# Patient Record
Sex: MALE | Race: WHITE | NOT HISPANIC OR LATINO | Employment: FULL TIME | URBAN - METROPOLITAN AREA
[De-identification: names, ages, dates, MRNs, and addresses within clinical notes are randomized per-mention and may not be internally consistent; named-entity substitution may affect disease eponyms.]

---

## 2017-03-21 ENCOUNTER — OFFICE VISIT (OUTPATIENT)
Dept: PEDIATRICS | Facility: CLINIC | Age: 37
End: 2017-03-21
Payer: COMMERCIAL

## 2017-03-21 VITALS
DIASTOLIC BLOOD PRESSURE: 70 MMHG | OXYGEN SATURATION: 99 % | TEMPERATURE: 99.1 F | HEIGHT: 70 IN | WEIGHT: 202 LBS | HEART RATE: 90 BPM | BODY MASS INDEX: 28.92 KG/M2 | SYSTOLIC BLOOD PRESSURE: 114 MMHG

## 2017-03-21 DIAGNOSIS — J10.1 INFLUENZA B: ICD-10-CM

## 2017-03-21 DIAGNOSIS — J02.0 ACUTE STREPTOCOCCAL PHARYNGITIS: Primary | ICD-10-CM

## 2017-03-21 DIAGNOSIS — R50.9 FEVER, UNSPECIFIED: ICD-10-CM

## 2017-03-21 LAB
DEPRECATED S PYO AG THROAT QL EIA: ABNORMAL
FLUAV+FLUBV AG SPEC QL: ABNORMAL
FLUAV+FLUBV AG SPEC QL: NEGATIVE
MICRO REPORT STATUS: ABNORMAL
SPECIMEN SOURCE: ABNORMAL
SPECIMEN SOURCE: ABNORMAL

## 2017-03-21 PROCEDURE — 87804 INFLUENZA ASSAY W/OPTIC: CPT | Mod: 59 | Performed by: INTERNAL MEDICINE

## 2017-03-21 PROCEDURE — 87880 STREP A ASSAY W/OPTIC: CPT | Performed by: INTERNAL MEDICINE

## 2017-03-21 PROCEDURE — 99214 OFFICE O/P EST MOD 30 MIN: CPT | Performed by: INTERNAL MEDICINE

## 2017-03-21 RX ORDER — OSELTAMIVIR PHOSPHATE 75 MG/1
75 CAPSULE ORAL 2 TIMES DAILY
Qty: 10 CAPSULE | Refills: 0 | Status: SHIPPED | OUTPATIENT
Start: 2017-03-21 | End: 2018-04-04

## 2017-03-21 RX ORDER — AMOXICILLIN 500 MG/1
500 CAPSULE ORAL 2 TIMES DAILY
Qty: 20 CAPSULE | Refills: 0 | Status: SHIPPED | OUTPATIENT
Start: 2017-03-21 | End: 2017-03-31

## 2017-03-21 NOTE — PROGRESS NOTES
"  SUBJECTIVE:                                                    Jose Luis Mazariegos is a 36 year old male who presents to clinic today for the following health issues:      Acute Illness   Acute illness concerns: sore throat, myalgias fatigue, headache, chills  Onset: 2 days    Fever: no    Chills/Sweats: YES    Headache (location?): YES    Sinus Pressure:YES    Conjunctivitis:  no    Ear Pain: no    Rhinorrhea: no    Congestion: YES    Sore Throat: YES     Cough: no    Wheeze: no    Decreased Appetite: no    Nausea: no    Vomiting: no    Diarrhea:  YES    Dysuria/Freq.: no    Fatigue/Achiness: YES    Sick/Strep Exposure: YES- son     Therapies Tried and outcome:       Patient Active Problem List   Diagnosis     CARDIOVASCULAR SCREENING; LDL GOAL LESS THAN 160     Past Surgical History   Procedure Laterality Date     Rectal fistula  2009       Social History   Substance Use Topics     Smoking status: Former Smoker     Types: Cigarettes     Quit date: 7/14/2000     Smokeless tobacco: Never Used     Alcohol use Yes      Comment: rarely     Family History   Problem Relation Age of Onset     Other Cancer Mother      Depression Sister      Anxiety Disorder Sister              ROS: The following systems have been completely reviewed and are negative except as noted in the HPI: CONSTITUTIONAL, HEAD AND NECK, PULMONARY    OBJECTIVE:                                                    /70 (Cuff Size: Adult Large)  Pulse 90  Temp 99.1  F (37.3  C) (Oral)  Ht 5' 9.5\" (1.765 m)  Wt 202 lb (91.6 kg)  SpO2 99%  BMI 29.4 kg/m2 Body mass index is 29.4 kg/(m^2).  GENERAL:  alert,  no distress  HENT: ear canals- normal; TMs- normal; oropharynx-   erythematous  NECK: no tenderness, no adenopathy  RESP: lungs clear to auscultation - no rales, no rhonchi, no wheezes  CV: regular rates and rhythm, normal S1 S2, no S3 or S4 and no murmur, no click or rub   MS: extremities- no edema     Results for orders placed or performed in visit on " 03/21/17   Strep, Rapid Screen   Result Value Ref Range    Specimen Description Throat     Rapid Strep A Screen (A)      POSITIVE: Group A Streptococcal antigen detected by immunoassay.    Micro Report Status FINAL 03/21/2017    Influenza A/B antigen   Result Value Ref Range    Influenza A/B Agn Specimen Nasal     Influenza A Negative NEG    Influenza B (A) NEG     Positive   Test results must be correlated with clinical data. If necessary, results   should be confirmed by a molecular assay or viral culture.          ASSESSMENT/PLAN:                                                        ICD-10-CM    1. Acute streptococcal pharyngitis J02.0 Strep, Rapid Screen     amoxicillin (AMOXIL) 500 MG capsule     Fluids, symptomatic cares. Start amoxicillin.      2. Influenza B J10.1 oseltamivir (TAMIFLU) 75 MG capsule   3. Fever, unspecified R50.9 Influenza A/B antigen      Influenza B. Discussed symptomatic cares.  Start Tamiflu       Kareem Houston MD  [facility]

## 2017-03-21 NOTE — LETTER
[facility]  [address]  [address]  [place] [address]        March 21, 2017    RE:    Jose Luis Mazariegos                                                                                     [address] [facility] [address]            To whom it may concern:    Jose Luis Mazariegos is under my professional care and was seen in the office today.  Off work due to illness on the following dates: March 21 and 22nd.          Sincerely,        Kareem Houston MD

## 2017-03-21 NOTE — NURSING NOTE
"Chief Complaint   Patient presents with     URI       Initial /70 (Cuff Size: Adult Large)  Pulse 90  Temp 99.1  F (37.3  C) (Oral)  Ht 5' 9.5\" (1.765 m)  Wt 202 lb (91.6 kg)  SpO2 99%  BMI 29.4 kg/m2 Estimated body mass index is 29.4 kg/(m^2) as calculated from the following:    Height as of this encounter: 5' 9.5\" (1.765 m).    Weight as of this encounter: 202 lb (91.6 kg).  Medication Reconciliation: complete  "

## 2017-03-21 NOTE — PATIENT INSTRUCTIONS
INSTRUCTIONS FOR TODAY:     amoxicillin for 10 days    Symptom management for cough and upper respiratory symptoms:    Sore throat: Warm salt water gargle as needed    Cough: Guaifenesin- an expectorant that helps to thin mucus and allow it to be cleared more easily.  Dextromethorphan helps to suppress cough.    Nasal and sinus congestion and drainage: Decongestants such as pseudoephedrine or phenylephrine help to reduce secretions and relieve stuffiness and pressure-related discomfort. Nasal saline spray can also be used to relieve sinus congestion as well.  Oxymetazoline (Afrin) spray can be used up to 3 days to manage nasal stuffiness.    Muscle aches and headache: Both acetaminophen and ibuprofen are effective-ibuprofen is slightly more effective for muscle aches and headache.  Ibuprofen should always be taken with food and plenty of fluids.    Dr Houston

## 2017-03-21 NOTE — MR AVS SNAPSHOT
After Visit Summary   3/21/2017    Jose Luis Mazariegos    MRN: [id number]           Patient Information     Date Of Birth          1980        Visit Information        Provider Department      3/21/2017 8:20 AM [facility]areem [facility] MD [facility]        Today's Diagnoses     Acute streptococcal pharyngitis    -  1    Fever, unspecified          Care Instructions    INSTRUCTIONS FOR TODAY:     amoxicillin for 10 days    Symptom management for cough and upper respiratory symptoms:    Sore throat: Warm salt water gargle as needed    Cough: Guaifenesin- an expectorant that helps to thin mucus and allow it to be cleared more easily.  Dextromethorphan helps to suppress cough.    Nasal and sinus congestion and drainage: Decongestants such as pseudoephedrine or phenylephrine help to reduce secretions and relieve stuffiness and pressure-related discomfort. Nasal saline spray can also be used to relieve sinus congestion as well.  Oxymetazoline (Afrin) spray can be used up to 3 days to manage nasal stuffiness.    Muscle aches and headache: Both acetaminophen and ibuprofen are effective-ibuprofen is slightly more effective for muscle aches and headache.  Ibuprofen should always be taken with food and plenty of fluids.    Dr Houston          Follow-ups after your visit        Who to contact     If you have questions or need follow up information about today's clinic visit or your schedule please contact [facility] directly at [phone].  Normal or non-critical lab and imaging results will be communicated to you by MyChart, letter or phone within 4 business days after the clinic has received the results. If you do not hear from us within 7 days, please contact the clinic through MyChart or phone. If you have a critical or abnormal lab result, we will notify you by phone as soon as possible.  Submit refill requests through [employer]. or call your pharmacy and they will forward the refill request  "to us. Please allow 3 business days for your refill to be completed.          Additional Information About Your Visit        [employer] Information     [employer] gives you secure access to your electronic health record. If you see a primary care provider, you can also send messages to your care team and make appointments. If you have questions, please call your primary care clinic.  If you do not have a primary care provider, please call [phone] and they will assist you.        Care EveryWhere ID     This is your Care EveryWhere ID. This could be used by other organizations to access your [facility] medical records  [id number]        Your Vitals Were     Pulse Temperature Height Pulse Oximetry BMI (Body Mass Index)       90 99.1  F (37.3  C) (Oral) 5' 9.5\" (1.765 m) 99% 29.4 kg/m2        Blood Pressure from Last 3 Encounters:   03/21/17 114/70   04/04/16 114/78   10/10/14 136/84    Weight from Last 3 Encounters:   03/21/17 202 lb (91.6 kg)   04/04/16 210 lb (95.3 kg)   10/10/14 219 lb 1.6 oz (99.4 kg)              We Performed the Following     Influenza A/B antigen     Strep, Rapid Screen          Today's Medication Changes          These changes are accurate as of: 3/21/17  8:52 AM.  If you have any questions, ask your nurse or doctor.               Start taking these medicines.        Dose/Directions    amoxicillin 500 MG capsule   Commonly known as:  AMOXIL   Used for:  Acute streptococcal pharyngitis   Started by:  Kareem Houston MD        Dose:  500 mg   Take 1 capsule (500 mg) by mouth 2 times daily for 10 days   Quantity:  20 capsule   Refills:  0         Stop taking these medicines if you haven't already. Please contact your care team if you have questions.     cyclobenzaprine 10 MG tablet   Commonly known as:  FLEXERIL   Stopped by:  Kareem Houston MD                Where to get your medicines      These medications were sent to [facility] [address] [place] [address]  " [address] [facility] [address]     Phone:  [phone]     amoxicillin 500 MG capsule                Primary Care Provider Office Phone # Fax #    Marycarmen Hodge [phone]       [address] [place] [address]        Thank you!     Thank you for choosing [facility]  for your care. Our goal is always to provide you with excellent care. Hearing back from our patients is one way we can continue to improve our services. Please take a few minutes to complete the written survey that you may receive in the mail after your visit with us. Thank you!             Your Updated Medication List - Protect others around you: Learn how to safely use, store and throw away your medicines at www.disposemymeds.org.          This list is accurate as of: 3/21/17  8:52 AM.  Always use your most recent med list.                   Brand Name Dispense Instructions for use    amoxicillin 500 MG capsule    AMOXIL    20 capsule    Take 1 capsule (500 mg) by mouth 2 times daily for 10 days

## 2018-04-04 ENCOUNTER — OFFICE VISIT (OUTPATIENT)
Dept: PEDIATRICS | Facility: CLINIC | Age: 38
End: 2018-04-04
Payer: COMMERCIAL

## 2018-04-04 ENCOUNTER — TELEPHONE (OUTPATIENT)
Dept: PEDIATRICS | Facility: CLINIC | Age: 38
End: 2018-04-04

## 2018-04-04 VITALS
BODY MASS INDEX: 32.5 KG/M2 | HEART RATE: 77 BPM | DIASTOLIC BLOOD PRESSURE: 80 MMHG | OXYGEN SATURATION: 97 % | TEMPERATURE: 99 F | SYSTOLIC BLOOD PRESSURE: 124 MMHG | HEIGHT: 70 IN | WEIGHT: 227 LBS

## 2018-04-04 DIAGNOSIS — R10.32 ABDOMINAL PAIN, LEFT LOWER QUADRANT: Primary | ICD-10-CM

## 2018-04-04 PROCEDURE — 99213 OFFICE O/P EST LOW 20 MIN: CPT | Mod: GE | Performed by: STUDENT IN AN ORGANIZED HEALTH CARE EDUCATION/TRAINING PROGRAM

## 2018-04-04 NOTE — TELEPHONE ENCOUNTER
"For the past couple of weeks has had abdominal cramping located in mid lower abdomen.  Has been more noticeable in the past few days.  Describes this as \"mild\".  No fever.  No other symptoms.  States he is doing all of his normal activities without any problems, including working out.  Appointment scheduled this afternoon.  No further questions.  Will call back if any other questions or concerns.  INDIA Tobias RN    "

## 2018-04-04 NOTE — PROGRESS NOTES
SUBJECTIVE:   Jose Luis Mazariegos is a 37 year old male who presents to clinic today for the following health issues:    Abdominal Pain      Duration: ongoing for awhile but has been more noticeable for the last week     Description (location/character/radiation): lower abdominal pain to the left side, pain raiating towards groin area        Associated flank pain: None    Intensity:  moderate    Accompanying signs and symptoms:        Fever/Chills: no        Gas/Bloating: no        Nausea/vomitting: no        Diarrhea: no        Dysuria or Hematuria: no child keep his poisoning    History (previous similar pain/trauma/previous testing): NA    Precipitating or alleviating factors:       Pain worse with eating/BM/urination: noticeable when diet is poor       Pain relieved by BM: no     Therapies tried and outcome: None    LMP:  not applicable     He had the pain once for a few hours several months ago.  In the last month he has had the pain several times.  Last 3-4 days he has had the pain near constantly.  At max it is a 2 out of 10 pain right now it is 1 out of 10.  It has not stopped him from doing anything in his life including eating drinking sleeping exercising weightlifting.  His routine has not changed.  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  Weightlifting regimen has not changed recently.  The pain is in the left lower quadrant, suprapubic area, radiates to the groin.  Does not radiate to the back.  On the unaffected by eating.  Has never had associated nausea or vomiting.  Has gained weight recently.  Denies dysuria and hematuria.  Has no change in his erectile function or ejaculate.  He is not constipated.  No penile discharge.       Problem list and histories reviewed & adjusted, as indicated.  Additional history: as documented    Patient Active Problem List   Diagnosis     CARDIOVASCULAR SCREENING; LDL GOAL LESS THAN 160     Past Surgical History:   Procedure Laterality Date     rectal fistula  2009       Social  "History   Substance Use Topics     Smoking status: Former Smoker     Types: Cigarettes     Quit date: 7/14/2000     Smokeless tobacco: Never Used     Alcohol use Yes      Comment: rarely     Family History   Problem Relation Age of Onset     Other Cancer Mother      Depression Sister      Anxiety Disorder Sister            Reviewed and updated as needed this visit by clinical staff       Reviewed and updated as needed this visit by Provider         ROS:  Constitutional, HEENT, cardiovascular, pulmonary, gi and gu systems are negative, except as otherwise noted.    OBJECTIVE:     /80 (BP Location: Right arm, Patient Position: Right side, Cuff Size: Adult Large)  Pulse 77  Temp 99  F (37.2  C) (Tympanic)  Ht 5' 9.5\" (1.765 m)  Wt 227 lb (103 kg)  SpO2 97%  BMI 33.04 kg/m2  Body mass index is 33.04 kg/(m^2).  GENERAL: healthy, alert and no distress  RESP: lungs clear to auscultation - no rales, rhonchi or wheezes  CV: regular rate and rhythm, normal S1 S2, no S3 or S4, no murmur, click or rub, no peripheral edema and peripheral pulses strong  ABDOMEN: soft, nontender, no hepatosplenomegaly, no masses and bowel sounds normal   (male): testicles normal without atrophy or masses, no hernias and penis normal without urethral discharge  MS: no gross musculoskeletal defects noted, no edema    Diagnostic Test Results:  none     ASSESSMENT/PLAN:     1. Abdominal pain, left lower quadrant  Very well-appearing, healthy gentleman here with mild pain that is not negatively affecting his day-to-day functioning.  I reviewed with him the ways in which my exam is very reassuring.  At this time, I doubt GI pathology such as diverticulitis, colitis, constipation.  I also doubt genitourinary pathology such as UTI, epididymitis given totally normal review of systems and testicular exam.  We discussed that we will use time as a diagnostic tool.  We will hope that he gets better and if he gets worse he will return to clinic at " that time can consider laboratory evaluation including blood and urine.  In the absence of red flag symptoms, I do not feel imaging is necessary at this time      As needed, follow up for worsening symptoms    Delfina Velasco MD  [facility]    I have discussed the patient with the resident and agree with the jointly developed plan as documented above    Marta Renner MD  Internal Medicine - Pediatrics

## 2018-04-04 NOTE — MR AVS SNAPSHOT
"              After Visit Summary   4/4/2018    Jose Luis Mazariegos    MRN: [id number]           Patient Information     Date Of Birth          1980        Visit Information        Provider Department      4/4/2018 3:45 PM Delfina Velasco MD [facility]        Today's Diagnoses     Abdominal pain, left lower quadrant    -  1       Follow-ups after your visit        Who to contact     If you have questions or need follow up information about today's clinic visit or your schedule please contact [facility] directly at [phone].  Normal or non-critical lab and imaging results will be communicated to you by [employer], letter or phone within 4 business days after the clinic has received the results. If you do not hear from us within 7 days, please contact the clinic through [employer] or phone. If you have a critical or abnormal lab result, we will notify you by phone as soon as possible.  Submit refill requests through [employer] or call your pharmacy and they will forward the refill request to us. Please allow 3 business days for your refill to be completed.          Additional Information About Your Visit        MyChart Information     [employer] gives you secure access to your electronic health record. If you see a primary care provider, you can also send messages to your care team and make appointments. If you have questions, please call your primary care clinic.  If you do not have a primary care provider, please call [phone] and they will assist you.        Care EveryWhere ID     This is your Care EveryWhere ID. This could be used by other organizations to access your [facility] medical records  [id number]        Your Vitals Were     Pulse Temperature Height Pulse Oximetry BMI (Body Mass Index)       77 99  F (37.2  C) (Tympanic) 5' 9.5\" (1.765 m) 97% 33.04 kg/m2        Blood Pressure from Last 3 Encounters:   04/04/18 124/80   03/21/17 114/70   04/04/16 114/78    Weight from Last 3 " Encounters:   04/04/18 227 lb (103 kg)   03/21/17 202 lb (91.6 kg)   04/04/16 210 lb (95.3 kg)              Today, you had the following     No orders found for display       Primary Care Provider Office Phone # Fax #    Marycarmen Hodge [phone] [address] [place] [address]        Equal Access to Services     [facility]: Hadii aad ku hadasho Soomaali, waaxda luqadaha, qaybta kaalmada adeegyada, waxay idiin hayaan adeeg kharash la'aan ah. So [facility] [phone].    ATENCIÓN: Si habla español, tiene a valle disposición servicios gratuitos de asistencia lingüística. Llame al [phone].    We comply with applicable federal civil rights laws and [place] laws. We do not discriminate on the basis of race, color, national origin, age, disability, sex, sexual orientation, or gender identity.            Thank you!     Thank you for choosing [facility]  for your care. Our goal is always to provide you with excellent care. Hearing back from our patients is one way we can continue to improve our services. Please take a few minutes to complete the written survey that you may receive in the mail after your visit with us. Thank you!             Your Updated Medication List - Protect others around you: Learn how to safely use, store and throw away your medicines at www.disposemymeds.org.      Notice  As of 4/4/2018 11:59 PM    You have not been prescribed any medications.

## 2018-05-16 ENCOUNTER — TELEPHONE (OUTPATIENT)
Dept: PEDIATRICS | Facility: CLINIC | Age: 38
End: 2018-05-16

## 2019-01-07 ASSESSMENT — ENCOUNTER SYMPTOMS
DYSURIA: 0
WEAKNESS: 0
MYALGIAS: 0
DIARRHEA: 0
COUGH: 0
PARESTHESIAS: 0
ABDOMINAL PAIN: 0
CHILLS: 0
DIZZINESS: 0
CONSTIPATION: 0
NERVOUS/ANXIOUS: 0
JOINT SWELLING: 0
FEVER: 0
PALPITATIONS: 0
SHORTNESS OF BREATH: 0
HEADACHES: 0
HEMATURIA: 0
ARTHRALGIAS: 0
HEARTBURN: 0
FREQUENCY: 0
EYE PAIN: 0
SORE THROAT: 0
NAUSEA: 0
HEMATOCHEZIA: 0

## 2019-01-08 ENCOUNTER — OFFICE VISIT (OUTPATIENT)
Dept: PEDIATRICS | Facility: CLINIC | Age: 39
End: 2019-01-08
Payer: COMMERCIAL

## 2019-01-08 VITALS
WEIGHT: 235 LBS | TEMPERATURE: 98.2 F | HEIGHT: 70 IN | HEART RATE: 68 BPM | OXYGEN SATURATION: 99 % | SYSTOLIC BLOOD PRESSURE: 118 MMHG | BODY MASS INDEX: 33.64 KG/M2 | DIASTOLIC BLOOD PRESSURE: 76 MMHG

## 2019-01-08 DIAGNOSIS — Z00.00 ENCOUNTER FOR ROUTINE ADULT HEALTH EXAMINATION WITHOUT ABNORMAL FINDINGS: Primary | ICD-10-CM

## 2019-01-08 DIAGNOSIS — E66.9 OBESITY, UNSPECIFIED OBESITY SEVERITY, UNSPECIFIED OBESITY TYPE: ICD-10-CM

## 2019-01-08 DIAGNOSIS — Z13.6 CARDIOVASCULAR SCREENING; LDL GOAL LESS THAN 160: ICD-10-CM

## 2019-01-08 LAB
ANION GAP SERPL CALCULATED.3IONS-SCNC: 5 MMOL/L (ref 3–14)
BUN SERPL-MCNC: 10 MG/DL (ref 7–30)
CALCIUM SERPL-MCNC: 9 MG/DL (ref 8.5–10.1)
CHLORIDE SERPL-SCNC: 109 MMOL/L (ref 94–109)
CHOLEST SERPL-MCNC: 186 MG/DL
CO2 SERPL-SCNC: 27 MMOL/L (ref 20–32)
CREAT SERPL-MCNC: 0.91 MG/DL (ref 0.66–1.25)
GFR SERPL CREATININE-BSD FRML MDRD: >90 ML/MIN/{1.73_M2}
GLUCOSE SERPL-MCNC: 99 MG/DL (ref 70–99)
HDLC SERPL-MCNC: 50 MG/DL
LDLC SERPL CALC-MCNC: 124 MG/DL
NONHDLC SERPL-MCNC: 136 MG/DL
POTASSIUM SERPL-SCNC: 3.9 MMOL/L (ref 3.4–5.3)
SODIUM SERPL-SCNC: 141 MMOL/L (ref 133–144)
TRIGL SERPL-MCNC: 58 MG/DL

## 2019-01-08 PROCEDURE — 80048 BASIC METABOLIC PNL TOTAL CA: CPT | Performed by: INTERNAL MEDICINE

## 2019-01-08 PROCEDURE — 90471 IMMUNIZATION ADMIN: CPT | Performed by: INTERNAL MEDICINE

## 2019-01-08 PROCEDURE — 90715 TDAP VACCINE 7 YRS/> IM: CPT | Performed by: INTERNAL MEDICINE

## 2019-01-08 PROCEDURE — 80061 LIPID PANEL: CPT | Performed by: INTERNAL MEDICINE

## 2019-01-08 PROCEDURE — 99395 PREV VISIT EST AGE 18-39: CPT | Mod: 25 | Performed by: INTERNAL MEDICINE

## 2019-01-08 PROCEDURE — 36415 COLL VENOUS BLD VENIPUNCTURE: CPT | Performed by: INTERNAL MEDICINE

## 2019-01-08 ASSESSMENT — ENCOUNTER SYMPTOMS
DIARRHEA: 0
NAUSEA: 0
HEADACHES: 0
HEMATOCHEZIA: 0
SHORTNESS OF BREATH: 0
PARESTHESIAS: 0
WEAKNESS: 0
EYE PAIN: 0
SORE THROAT: 0
ARTHRALGIAS: 0
FEVER: 0
MYALGIAS: 0
COUGH: 0
HEARTBURN: 0
JOINT SWELLING: 0
DYSURIA: 0
CHILLS: 0
PALPITATIONS: 0
FREQUENCY: 0
NERVOUS/ANXIOUS: 0
CONSTIPATION: 0
HEMATURIA: 0
ABDOMINAL PAIN: 0
DIZZINESS: 0

## 2019-01-08 ASSESSMENT — MIFFLIN-ST. JEOR: SCORE: 1984.26

## 2019-01-08 NOTE — PROGRESS NOTES
SUBJECTIVE:   CC: Jose Luis Mazariegos is an 38 year old male who presents for preventative health visit.     Physical   Annual:     Getting at least 3 servings of Calcium per day:  Yes    Bi-annual eye exam:  Yes    Dental care twice a year:  NO    Sleep apnea or symptoms of sleep apnea:  None    Diet:  Regular (no restrictions)    Frequency of exercise:  2-3 days/week    Duration of exercise:  45-60 minutes    Taking medications regularly:  Yes    Medication side effects:  Not applicable    Additional concerns today:  No    PHQ-2 Total Score: 0              Today's PHQ-2 Score:   PHQ-2 (  [employer]) 2019   Q1: Little interest or pleasure in doing things 0   Q2: Feeling down, depressed or hopeless 0   PHQ-2 Score 0   Q1: Little interest or pleasure in doing things Not at all   Q2: Feeling down, depressed or hopeless Not at all   PHQ-2 Score 0       Abuse: Current or Past(Physical, Sexual or Emotional)- No  Do you feel safe in your environment? Yes    Social History     Tobacco Use     Smoking status: Former Smoker     Types: Cigarettes     Last attempt to quit: 2000     Years since quittin.4     Smokeless tobacco: Never Used   Substance Use Topics     Alcohol use: Yes     Comment: rarely     Alcohol Use 2019   If you drink alcohol do you typically have greater than 3 drinks per day OR greater than 7 drinks per week? No       Last PSA: No results found for: PSA    Reviewed orders with patient. Reviewed health maintenance and updated orders accordingly - Yes      Reviewed and updated as needed this visit by clinical staff  Tobacco  Allergies  Meds         Reviewed and updated as needed this visit by Provider          Patient Active Problem List   Diagnosis     CARDIOVASCULAR SCREENING; LDL GOAL LESS THAN 160     Obesity, unspecified obesity severity, unspecified obesity type     Past Medical History:   Diagnosis Date     Perianal fistula        Past Surgical History:   Procedure Laterality Date      "rectal fistula  2009       Family History   Problem Relation Age of Onset     Other Cancer Mother      Depression Sister      Anxiety Disorder Sister      Colon Cancer No family hx of      Prostate Cancer No family hx of      Diabetes No family hx of      Hypertension No family hx of        ALLERGIES   No Known Allergies      Review of Systems   Constitutional: Negative for chills and fever.   HENT: Negative for congestion, ear pain, hearing loss and sore throat.    Eyes: Negative for pain and visual disturbance.   Respiratory: Negative for cough and shortness of breath.    Cardiovascular: Negative for chest pain, palpitations and peripheral edema.   Gastrointestinal: Negative for abdominal pain, constipation, diarrhea, heartburn, hematochezia and nausea.   Genitourinary: Negative for discharge, dysuria, frequency, genital sores, hematuria, impotence and urgency.   Musculoskeletal: Negative for arthralgias, joint swelling and myalgias.   Skin: Negative for rash.   Neurological: Negative for dizziness, weakness, headaches and paresthesias.   Psychiatric/Behavioral: Negative for mood changes. The patient is not nervous/anxious.          OBJECTIVE:   /76 (Cuff Size: Adult Large)   Pulse 68   Temp 98.2  F (36.8  C) (Oral)   Ht 1.765 m (5' 9.5\")   Wt 106.6 kg (235 lb)   SpO2 99%   BMI 34.21 kg/m      Physical Exam  GENERAL: healthy, alert and no distress  EYES: Eyes grossly normal to inspection, PERRL and conjunctivae and sclerae normal  HENT: ear canals and TM's normal, nose and mouth without ulcers or lesions  NECK: no adenopathy, no asymmetry, masses, or scars and thyroid normal to palpation  RESP: lungs clear to auscultation - no rales, rhonchi or wheezes  CV: regular rate and rhythm, normal S1 S2, no S3 or S4, no murmur, click or rub, no peripheral edema and peripheral pulses strong  ABDOMEN: soft, nontender, no hepatosplenomegaly, no masses and bowel sounds normal  MS: no gross musculoskeletal defects " "noted, no edema  SKIN: no suspicious lesions or rashes  NEURO: Normal strength and tone, mentation intact and speech normal  PSYCH: mentation appears normal, affect normal/bright      ASSESSMENT/PLAN:       ICD-10-CM    1. Encounter for routine adult health examination without abnormal findings Z00.00        2. Obesity, unspecified obesity severity, unspecified obesity type E66.9 Weight management and obesity are discussed with the patient.  discussed the balance between caloric intake and caloric expenditure.  discussed the caloric content of certain types of foods as well as healthy eating habits.  encouraged the patient to monitor portion sizes and consider a calorie restrictive diet or plan       Goal weight 205-210lbs     3. CARDIOVASCULAR SCREENING; LDL GOAL LESS THAN 160 Z13.6 Lipid panel reflex to direct LDL Fasting     Basic metabolic panel  (Ca, Cl, CO2, Creat, Gluc, K, Na, BUN)       COUNSELING:   Reviewed preventive health counseling, as reflected in patient instructions       Regular exercise       Healthy diet/nutrition       Colon cancer screening       Prostate cancer screening    BP Readings from Last 1 Encounters:   01/08/19 118/76     Estimated body mass index is 34.21 kg/m  as calculated from the following:    Height as of this encounter: 1.765 m (5' 9.5\").    Weight as of this encounter: 106.6 kg (235 lb).    Weight management plan: Discussed healthy diet and exercise guidelines     reports that he quit smoking about 18 years ago. His smoking use included cigarettes. he has never used smokeless tobacco.      Counseling Resources:  ATP IV Guidelines  Pooled Cohorts Equation Calculator  FRAX Risk Assessment  ICSI Preventive Guidelines  Dietary Guidelines for Americans, 2010  USDA's MyPlate  ASA Prophylaxis  Lung CA Screening    Kareem Houston MD, MD  [facility]  "

## 2019-01-08 NOTE — NURSING NOTE
Screening Questionnaire for Adult Immunization    Are you sick today?   No   Do you have allergies to medications, food, a vaccine component or latex?   No   Have you ever had a serious reaction after receiving a vaccination?   No   Do you have a long-term health problem with heart disease, lung disease, asthma, kidney disease, metabolic disease (e.g. diabetes), anemia, or other blood disorder?   No   Do you have cancer, leukemia, HIV/AIDS, or any other immune system problem?   No   In the past 3 months, have you taken medications that affect  your immune system, such as prednisone, other steroids, or anticancer drugs; drugs for the treatment of rheumatoid arthritis, Crohn s disease, or psoriasis; or have you had radiation treatments?   No   Have you had a seizure, or a brain or other nervous system problem?   No   During the past year, have you received a transfusion of blood or blood     products, or been given immune (gamma) globulin or antiviral drug?   No   For women: Are you pregnant or is there a chance you could become        pregnant during the next month?   No   Have you received any vaccinations in the past 4 weeks?   No     Immunization questionnaire answers were all negative.             Screening performed by Stefany Rangel on 1/8/2019 at 8:22 AM.

## 2019-11-27 ENCOUNTER — OFFICE VISIT (OUTPATIENT)
Dept: PODIATRY | Facility: CLINIC | Age: 39
End: 2019-11-27
Payer: COMMERCIAL

## 2019-11-27 VITALS
WEIGHT: 235 LBS | BODY MASS INDEX: 33.64 KG/M2 | DIASTOLIC BLOOD PRESSURE: 70 MMHG | HEIGHT: 70 IN | SYSTOLIC BLOOD PRESSURE: 116 MMHG

## 2019-11-27 DIAGNOSIS — L84 CALLUS OF FOOT: Primary | ICD-10-CM

## 2019-11-27 PROCEDURE — 99203 OFFICE O/P NEW LOW 30 MIN: CPT | Performed by: PODIATRIST

## 2019-11-27 ASSESSMENT — MIFFLIN-ST. JEOR: SCORE: 1979.26

## 2019-11-27 NOTE — LETTER
"    11/27/2019         RE: Jose Luis Mazariegos  [address] [place] [address]        Dear Colleague,    Thank you for referring your patient, Jose Luis Mazariegos, to the [facility]. Please see a copy of my visit note below.      ASSESSMENT/PLAN:    Encounter Diagnosis   Name Primary?     Callus of foot Yes     I did not find any problems.   Nothing to suggest warts.  I explained that there is likely skin rubbing in certain shoes and that the proximal interphalangeal joints are somewhat prominent.   I advised shoes with a taller toe box  Padding is an option - toe sleeves  There is nothing for him to file.  This is more thickening of skin than true callus formation.    Body mass index is 34.21 kg/m .    Weight management plan: Patient was referred to their PCP to discuss a diet and exercise plan.      Femi Cruz DPM, FACFAS, MS    [facility]      ____________________________________________________________________    HPI:         Chief Complaint: wart, rash on left second and third toe  Onset of problem: 1 week  He said that he had mild discomfort.  Previous treatment: Compound W, \"Freeze off\"  *  Patient Active Problem List   Diagnosis     CARDIOVASCULAR SCREENING; LDL GOAL LESS THAN 160     Obesity, unspecified obesity severity, unspecified obesity type   *  *  Past Surgical History:   Procedure Laterality Date     rectal fistula  2009   *  *  No current outpatient medications on file.       ROS:     A 10-point review of systems was performed and is positive for that noted above in the HPI and as seen below.  All other areas are negative.     Numbness in feet?  no   Calf pain with walking? no  Recent foot/ankle injury? no  Weight change over past 12 months? no  Self perception as overweight? yes  Recent flu-like symptoms? no  Joint pain other than feet ? no    Social History: Employment:  finance;  Exercise/Physical activity:  Running, lifting " "1-2x/ week;  Tobacco use:  no  Social History     Socioeconomic History     Marital status:      Spouse name: Not on file     Number of children: Not on file     Years of education: Not on file     Highest education level: Not on file   Occupational History     Not on file   Social Needs     Financial resource strain: Not on file     Food insecurity:     Worry: Not on file     Inability: Not on file     Transportation needs:     Medical: Not on file     Non-medical: Not on file   Tobacco Use     Smoking status: Former Smoker     Types: Cigarettes     Last attempt to quit: 2000     Years since quittin.3     Smokeless tobacco: Never Used   Substance and Sexual Activity     Alcohol use: Yes     Comment: rarely     Drug use: No     Sexual activity: Yes     Partners: Female   Lifestyle     Physical activity:     Days per week: Not on file     Minutes per session: Not on file     Stress: Not on file   Relationships     Social connections:     Talks on phone: Not on file     Gets together: Not on file     Attends [employer]ism service: Not on file     Active member of club or organization: Not on file     Attends meetings of clubs or organizations: Not on file     Relationship status: Not on file     Intimate partner violence:     Fear of current or ex partner: Not on file     Emotionally abused: Not on file     Physically abused: Not on file     Forced sexual activity: Not on file   Other Topics Concern     Parent/sibling w/ CABG, MI or angioplasty before 65F 55M? No   Social History Narrative     Not on file       Family history:  Family History   Problem Relation Age of Onset     Other Cancer Mother      Depression Sister      Anxiety Disorder Sister      Colon Cancer No family hx of      Prostate Cancer No family hx of      Diabetes No family hx of      Hypertension No family hx of        Rheumatoid arthritis:  no  Foot Problems: no  Diabetes: no      EXAM:    Vitals: /70   Ht 1.765 m (5' 9.5\")   " "Wt 106.6 kg (235 lb)   BMI 34.21 kg/m     BMI: Body mass index is 34.21 kg/m .  Height: 5' 9.5\"    Constitutional/ general:  Pt is in no apparent distress, appears well-nourished.  Cooperative with history and physical exam.     Vascular:  Pedal pulses are palpable bilaterally for both the DP and PT arteries.  CFT < 3 sec.  No edema.  Pedal hair growth noted.     Neuro:  Alert and oriented x 3. Coordinated gait.  Light touch sensation is intact to the L4, L5, S1 distributions. No obvious deficits.  No evidence of neurological-based weakness, spasticity, or contracture in the lower extremities.     Derm: Normal texture and turgor.  No erythema, ecchymosis, or cyanosis.  No open lesions.   Mild hyperkeratosis with light pigmentation over the left second and third toe proximal interphalangeal joints. No ulcerations. No verrucoid changes.    Musculoskeletal:    Lower extremity muscle strength is normal.  Patient is ambulatory without an assistive device or brace .  No gross deformities.  Mild contractures of the involved toes.               Again, thank you for allowing me to participate in the care of your patient.        Sincerely,        Femi Cruz DPM    "

## 2019-11-27 NOTE — PROGRESS NOTES
"  ASSESSMENT/PLAN:    Encounter Diagnosis   Name Primary?     Callus of foot Yes     I did not find any problems.   Nothing to suggest warts.  I explained that there is likely skin rubbing in certain shoes and that the proximal interphalangeal joints are somewhat prominent.   I advised shoes with a taller toe box  Padding is an option - toe sleeves  There is nothing for him to file.  This is more thickening of skin than true callus formation.    Body mass index is 34.21 kg/m .    Weight management plan: Patient was referred to their PCP to discuss a diet and exercise plan.      Femi Cruz DPM, FACFAS, MS    [facility]      ____________________________________________________________________    HPI:         Chief Complaint: wart, rash on left second and third toe  Onset of problem: 1 week  He said that he had mild discomfort.  Previous treatment: Compound W, \"Freeze off\"  *  Patient Active Problem List   Diagnosis     CARDIOVASCULAR SCREENING; LDL GOAL LESS THAN 160     Obesity, unspecified obesity severity, unspecified obesity type   *  *  Past Surgical History:   Procedure Laterality Date     rectal fistula  2009   *  *  No current outpatient medications on file.       ROS:     A 10-point review of systems was performed and is positive for that noted above in the HPI and as seen below.  All other areas are negative.     Numbness in feet?  no   Calf pain with walking? no  Recent foot/ankle injury? no  Weight change over past 12 months? no  Self perception as overweight? yes  Recent flu-like symptoms? no  Joint pain other than feet ? 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/ week;  Tobacco use:  no  Social History     Socioeconomic History     Marital status:      Spouse name: Not on file     Number of children: Not on file     Years of education: Not on file     Highest education level: Not on file " "  Occupational History     Not on file   Social Needs     Financial resource strain: Not on file     Food insecurity:     Worry: Not on file     Inability: Not on file     Transportation needs:     Medical: Not on file     Non-medical: Not on file   Tobacco Use     Smoking status: Former Smoker     Types: Cigarettes     Last attempt to quit: 2000     Years since quittin.3     Smokeless tobacco: Never Used   Substance and Sexual Activity     Alcohol use: Yes     Comment: rarely     Drug use: No     Sexual activity: Yes     Partners: Female   Lifestyle     Physical activity:     Days per week: Not on file     Minutes per session: Not on file     Stress: Not on file   Relationships     Social connections:     Talks on phone: Not on file     Gets together: Not on file     Attends Hoahaoism service: Not on file     Active member of club or organization: Not on file     Attends meetings of clubs or organizations: Not on file     Relationship status: Not on file     Intimate partner violence:     Fear of current or ex partner: Not on file     Emotionally abused: Not on file     Physically abused: Not on file     Forced sexual activity: Not on file   Other Topics Concern     Parent/sibling w/ CABG, MI or angioplasty before 65F 55M? No   Social History Narrative     Not on file       Family history:  Family History   Problem Relation Age of Onset     Other Cancer Mother      Depression Sister      Anxiety Disorder Sister      Colon Cancer No family hx of      Prostate Cancer No family hx of      Diabetes No family hx of      Hypertension No family hx of        Rheumatoid arthritis:  no  Foot Problems: no  Diabetes: no      EXAM:    Vitals: /70   Ht 1.765 m (5' 9.5\")   Wt 106.6 kg (235 lb)   BMI 34.21 kg/m    BMI: Body mass index is 34.21 kg/m .  Height: 5' 9.5\"    Constitutional/ general:  Pt is in no apparent distress, appears well-nourished.  Cooperative with history and physical exam.     Vascular:  " Pedal pulses are palpable bilaterally for both the DP and PT arteries.  CFT < 3 sec.  No edema.  Pedal hair growth noted.     Neuro:  Alert and oriented x 3. Coordinated gait.  Light touch sensation is intact to the L4, L5, S1 distributions. No obvious deficits.  No evidence of neurological-based weakness, spasticity, or contracture in the lower extremities.     Derm: Normal texture and turgor.  No erythema, ecchymosis, or cyanosis.  No open lesions.   Mild hyperkeratosis with light pigmentation over the left second and third toe proximal interphalangeal joints. No ulcerations. No verrucoid changes.    Musculoskeletal:    Lower extremity muscle strength is normal.  Patient is ambulatory without an assistive device or brace .  No gross deformities.  Mild contractures of the involved toes.

## 2020-03-01 ENCOUNTER — HEALTH MAINTENANCE LETTER (OUTPATIENT)
Age: 40
End: 2020-03-01

## 2020-09-02 ENCOUNTER — OFFICE VISIT (OUTPATIENT)
Dept: FAMILY MEDICINE | Facility: CLINIC | Age: 40
End: 2020-09-02
Payer: COMMERCIAL

## 2020-09-02 VITALS
WEIGHT: 240 LBS | SYSTOLIC BLOOD PRESSURE: 126 MMHG | DIASTOLIC BLOOD PRESSURE: 74 MMHG | BODY MASS INDEX: 34.93 KG/M2 | OXYGEN SATURATION: 100 % | HEART RATE: 85 BPM | RESPIRATION RATE: 16 BRPM | TEMPERATURE: 97.7 F

## 2020-09-02 DIAGNOSIS — N50.89 TESTICLE LUMP: Primary | ICD-10-CM

## 2020-09-02 PROCEDURE — 99214 OFFICE O/P EST MOD 30 MIN: CPT | Performed by: FAMILY MEDICINE

## 2020-09-02 NOTE — PROGRESS NOTES
Subjective     Jose Luis Mazariegos is a 39 year old male who presents to clinic today for the following health issues:    HPI       Concern - mass on testicle  Onset: noticed a few days ago  Description: left testicle, found something abnormal; but unable to feel it today.  Intensity: na  Progression of Symptoms:  na  Accompanying Signs & Symptoms: na  Previous history of similar problem: na  Precipitating factors:        Worsened by: na  Alleviating factors:        Improved by: na  Therapies tried and outcome:  none     Lump is not painful.  No previous lumps palpated in the past.  No discharge or rash.  No fever.   Thinks lump may actually just be part of his normal anatomy ut is unsure.     Review of Systems   CONSTITUTIONAL: NEGATIVE for fever, chills, change in weight  INTEGUMENTARY/SKIN: NEGATIVE for worrisome rashes, moles or lesions  EYES: NEGATIVE for vision changes or irritation  ENT/MOUTH: NEGATIVE for ear, mouth and throat problems  RESP: NEGATIVE for significant cough or SOB  CV: NEGATIVE for chest pain, palpitations or peripheral edema  GI: NEGATIVE for nausea, abdominal pain, heartburn, or change in bowel habits  : NEGATIVE for frequency, dysuria, or hematuria  MUSCULOSKELETAL: NEGATIVE for significant arthralgias or myalgia  NEURO: NEGATIVE for weakness, dizziness or paresthesias  HEME: NEGATIVE for bleeding problems      Objective    /74   Pulse 85   Temp 97.7  F (36.5  C) (Tympanic)   Resp 16   Wt 108.9 kg (240 lb)   SpO2 100%   BMI 34.93 kg/m    Body mass index is 34.93 kg/m .  Physical Exam   GENERAL: healthy, alert and no distress  EYES: Eyes grossly normal to inspection, PERRL and conjunctivae and sclerae normal  HENT: normal cephalic/atraumatic and wearing face mask  NECK: no adenopathy, no asymmetry, masses, or scars and thyroid normal to palpation  RESP: lungs clear to auscultation - no rales, rhonchi or wheezes  CV: regular rate and rhythm, normal S1 S2, no S3 or S4, no murmur,  "click or rub, no peripheral edema and peripheral pulses strong   (male): (joana SEGUNDO present during genital exam) normal male genitalia without lesions or urethral discharge, no hernia; no palpable lumps or nodules.  MS: no gross musculoskeletal defects noted, no edema  SKIN: no suspicious lesions or rashes  NEURO: Normal strength and tone, mentation intact and speech normal    No results found for this or any previous visit (from the past 24 hour(s)).        Assessment & Plan   Problem List Items Addressed This Visit     None      Visit Diagnoses     Testicle lump    -  Primary    Relevant Orders    US Testicular & Scrotum w Doppler Ltd         Unable to palpate any lump on exam, but will obtain ultrasound imaging to help rule this out.     BMI:   Estimated body mass index is 34.93 kg/m  as calculated from the following:    Height as of 11/27/19: 1.765 m (5' 9.5\").    Weight as of this encounter: 108.9 kg (240 lb).   Weight management plan: Discussed healthy diet and exercise guidelines        See Patient Instructions  Return in about 4 weeks (around 9/30/2020) for re-check / follow-up, If needed.    Komal Avila, DO  [facility]    "

## 2020-09-25 ENCOUNTER — OFFICE VISIT (OUTPATIENT)
Dept: FAMILY MEDICINE | Facility: CLINIC | Age: 40
End: 2020-09-25
Payer: COMMERCIAL

## 2020-09-25 ENCOUNTER — NURSE TRIAGE (OUTPATIENT)
Dept: NURSING | Facility: CLINIC | Age: 40
End: 2020-09-25

## 2020-09-25 ENCOUNTER — ANCILLARY PROCEDURE (OUTPATIENT)
Dept: GENERAL RADIOLOGY | Facility: CLINIC | Age: 40
End: 2020-09-25
Attending: PHYSICIAN ASSISTANT
Payer: COMMERCIAL

## 2020-09-25 VITALS
TEMPERATURE: 98.4 F | WEIGHT: 234.56 LBS | DIASTOLIC BLOOD PRESSURE: 76 MMHG | BODY MASS INDEX: 34.14 KG/M2 | SYSTOLIC BLOOD PRESSURE: 128 MMHG | OXYGEN SATURATION: 100 % | HEART RATE: 103 BPM

## 2020-09-25 DIAGNOSIS — R10.84 ABDOMINAL PAIN, GENERALIZED: Primary | ICD-10-CM

## 2020-09-25 DIAGNOSIS — K59.00 CONSTIPATION, UNSPECIFIED CONSTIPATION TYPE: ICD-10-CM

## 2020-09-25 DIAGNOSIS — R10.84 ABDOMINAL PAIN, GENERALIZED: ICD-10-CM

## 2020-09-25 LAB
BASOPHILS # BLD AUTO: 0 10E9/L (ref 0–0.2)
BASOPHILS NFR BLD AUTO: 0.2 %
DIFFERENTIAL METHOD BLD: ABNORMAL
EOSINOPHIL # BLD AUTO: 0.4 10E9/L (ref 0–0.7)
EOSINOPHIL NFR BLD AUTO: 3.3 %
ERYTHROCYTE [DISTWIDTH] IN BLOOD BY AUTOMATED COUNT: 13.8 % (ref 10–15)
HCT VFR BLD AUTO: 50.9 % (ref 40–53)
HGB BLD-MCNC: 17.5 G/DL (ref 13.3–17.7)
LYMPHOCYTES # BLD AUTO: 2.3 10E9/L (ref 0.8–5.3)
LYMPHOCYTES NFR BLD AUTO: 18.1 %
MCH RBC QN AUTO: 28.6 PG (ref 26.5–33)
MCHC RBC AUTO-ENTMCNC: 34.4 G/DL (ref 31.5–36.5)
MCV RBC AUTO: 83 FL (ref 78–100)
MONOCYTES # BLD AUTO: 1.2 10E9/L (ref 0–1.3)
MONOCYTES NFR BLD AUTO: 9.4 %
NEUTROPHILS # BLD AUTO: 8.8 10E9/L (ref 1.6–8.3)
NEUTROPHILS NFR BLD AUTO: 69 %
PLATELET # BLD AUTO: 385 10E9/L (ref 150–450)
RBC # BLD AUTO: 6.12 10E12/L (ref 4.4–5.9)
WBC # BLD AUTO: 12.7 10E9/L (ref 4–11)

## 2020-09-25 PROCEDURE — 85025 COMPLETE CBC W/AUTO DIFF WBC: CPT | Performed by: PHYSICIAN ASSISTANT

## 2020-09-25 PROCEDURE — 36415 COLL VENOUS BLD VENIPUNCTURE: CPT | Performed by: PHYSICIAN ASSISTANT

## 2020-09-25 PROCEDURE — 99214 OFFICE O/P EST MOD 30 MIN: CPT | Performed by: PHYSICIAN ASSISTANT

## 2020-09-25 PROCEDURE — 74019 RADEX ABDOMEN 2 VIEWS: CPT

## 2020-09-25 RX ORDER — POLYETHYLENE GLYCOL 3350 17 G/17G
1 POWDER, FOR SOLUTION ORAL DAILY
Qty: 507 G | Refills: 0 | Status: SHIPPED | OUTPATIENT
Start: 2020-09-25 | End: 2021-03-31

## 2020-09-25 NOTE — TELEPHONE ENCOUNTER
Patient calling in stating abdominal pain the last 2 days ago (Wednesday night), not able to eat a lot since then. Drinking water and eating makes it worse. Thursday he left work and pain was moving around all night. NO vomiting, no diarrhea, no nausea or fever. Pain now is 2/10 and one hour ago it was a 7/10. Last BM was 3-4 days ago and looser than normal. Non black, tarry or red. Pain last night woke him up at times. At [employer] on Wednesday which is when the pain started. Advised an appointment to be seen today.   Irina Whitley RN on 9/25/2020 at 7:37 AM    Additional Information    Negative: Passed out (i.e., fainted, collapsed and was not responding)    Negative: Shock suspected (e.g., cold/pale/clammy skin, too weak to stand, low BP, rapid pulse)    Negative: Sounds like a life-threatening emergency to the triager    Negative: Chest pain    Negative: Pain is mainly in upper abdomen (if needed ask: 'is it mainly above the belly button?')    Negative: SEVERE abdominal pain (e.g., excruciating)    Negative: Vomiting red blood or black (coffee ground) material    Negative: Bloody, black, or tarry bowel movements    Negative: Unable to urinate (or only a few drops) and bladder feels very full    Negative: Pain in scrotum persists > 1 hour    Negative: Constant abdominal pain lasting > 2 hours    Negative: Vomiting bile (green color)    Negative: Patient sounds very sick or weak to the triager    Negative: Vomiting and abdomen looks much more swollen than usual    Negative: White of the eyes have turned yellow (i.e., jaundice)    Negative: Blood in urine (red, pink, or tea-colored)    Negative: Fever > 103 F (39.4 C)    Negative: Fever > 101 F (38.3 C) and over 60 years of age    Negative: Fever > 100.0 F (37.8 C) and has diabetes mellitus or a weak immune system (e.g., HIV positive, cancer chemotherapy, organ transplant, splenectomy, chronic steroids)    Negative: Fever > 100.0 F (37.8 C) and bedridden (e.g.,  nursing home patient, stroke, chronic illness, recovering from surgery)    Mild pain that comes and goes (cramps) lasts > 24 hours    Protocols used: ABDOMINAL PAIN - MALE-A-OH

## 2020-09-25 NOTE — PROGRESS NOTES
Subjective     Jose Luis Mazariegos is a 39 year old male who presents to clinic today for the following health issues:    HPI       Abdominal/Flank Pain  Onset/Duration: 09/22/2020  Description:   Character: Cramping  Location: upper abdominal region and lower abdomen   Radiation: None  Intensity: moderate  Progression of Symptoms:  same  Accompanying Signs & Symptoms:  Fever/Chills: no  Gas/Bloating: YES  Nausea: no  Vomitting: no  Diarrhea: no- a couple very small, very loose stools (2-3)  Constipation: YES- pt has not had a bowel movement in 3-4 days   Dysuria or Hematuria: no  History:   Trauma: no  Previous similar pain: YES  Previous tests done: none  Precipitating factors:   Does the pain change with:     Food: YES- more pain     Bowel Movement: no    Urination: no   Other factors:  no  Therapies tried and outcome: None        Review of Systems   Constitutional, HEENT, cardiovascular, pulmonary, GI, , musculoskeletal, neuro, skin, endocrine and psych systems are negative, except as otherwise noted.        Objective    /76 (BP Location: Right arm, Patient Position: Chair, Cuff Size: Adult Large)   Pulse 103   Temp 98.4  F (36.9  C) (Oral)   Wt 106.4 kg (234 lb 9 oz)   SpO2 100%   BMI 34.14 kg/m    Body mass index is 34.14 kg/m .       Physical Exam   GENERAL: healthy, alert and no distress  EYES: Eyes grossly normal to inspection, PERRL and conjunctivae and sclerae normal  RESP: lungs clear to auscultation - no rales, rhonchi or wheezes  CV: regular rate and rhythm, normal S1 S2, no S3 or S4, no murmur, click or rub, no peripheral edema and peripheral pulses strong  ABDOMEN: Mildly tender diffusely. No guarding or rebound tenderness. soft, no hepatosplenomegaly, no masses and bowel sounds normal  MS: no gross musculoskeletal defects noted, no edema  SKIN: no suspicious lesions or rashes  NEURO: Normal strength and tone, mentation intact and speech normal  BACK: no CVA tenderness  PSYCH: mentation  appears normal, affect normal/bright      Results for orders placed or performed in visit on 09/25/20 (from the past 24 hour(s))   CBC with platelets differential   Result Value Ref Range    WBC 12.7 (H) 4.0 - 11.0 10e9/L    RBC Count 6.12 (H) 4.4 - 5.9 10e12/L    Hemoglobin 17.5 13.3 - 17.7 g/dL    Hematocrit 50.9 40.0 - 53.0 %    MCV 83 78 - 100 fl    MCH 28.6 26.5 - 33.0 pg    MCHC 34.4 31.5 - 36.5 g/dL    RDW 13.8 10.0 - 15.0 %    Platelet Count 385 150 - 450 10e9/L    % Neutrophils 69.0 %    % Lymphocytes 18.1 %    % Monocytes 9.4 %    % Eosinophils 3.3 %    % Basophils 0.2 %    Absolute Neutrophil 8.8 (H) 1.6 - 8.3 10e9/L    Absolute Lymphocytes 2.3 0.8 - 5.3 10e9/L    Absolute Monocytes 1.2 0.0 - 1.3 10e9/L    Absolute Eosinophils 0.4 0.0 - 0.7 10e9/L    Absolute Basophils 0.0 0.0 - 0.2 10e9/L    Diff Method Automated Method      Xray - Constipation but no obstruction per my read.        Assessment & Plan     Abdominal pain, generalized    Possibly related to constipation. Ordered CT scan since WBC is elevated.     - CBC with platelets differential  - XR Abdomen 2 Views  - polyethylene glycol (MIRALAX) 17 GM/Dose powder  Dispense: 507 g; Refill: 0  - CT Abdomen Pelvis w Contrast      Constipation, unspecified constipation type    Treat with miralax.    - polyethylene glycol (MIRALAX) 17 GM/Dose powder  Dispense: 507 g; Refill: 0         Patient Instructions   Take miralax twice a day for 1 week. Then decrease to once a day.    Follow-up if not improving in 2 weeks or sooner if worsening.    You can take Tylenol or ibuprofen as needed for pain.        Patient Education     Constipation (Adult)  Constipation means that you have bowel movements that are less frequent than usual. Stools often become very hard and difficult to pass.  Constipation is very common. At some point in life, it affects almost everyone. Since everyone's bowel habits are different, what is constipation to one person may not be to another.  Your healthcare provider may do tests to diagnose constipation. It depends on what he or she finds when evaluating you.    Symptoms of constipation include:    Abdominal pain    Bloating    Vomiting    Painful bowel movements    Itching, swelling, bleeding, or pain around the anus  Causes  Constipation can have many causes. These include:    Diet low in fiber    Too much dairy    Not drinking enough liquids    Lack of exercise or physical activity (especially true for older adults)    Changes in lifestyle or daily routine, including pregnancy, aging, work, and travel    Frequent use or misuse of laxatives    Ignoring the urge to have a bowel movement or delaying it until later    Medicines, such as certain prescription pain medicines, iron supplements, antacids, certain antidepressants, and calcium supplements    Diseases like irritable bowel syndrome, bowel obstructions, stroke, diabetes, thyroid disease, Parkinson disease, hemorrhoids, and colon cancer  Complications  Potential complications of constipation can include:    Hemorrhoids    Rectal bleeding from hemorrhoids or anal fissures (skin tears)    Hernias    Dependency on laxatives    Chronic constipation    Fecal impaction, a severe form of constipation in which a large amount of hard stool is in your rectum that you can't pass    Bowel obstruction or perforation  Home care  All treatment should be done after talking with your healthcare provider. This is especially true if you have another medical problems, are taking prescription medicines, or are an older adult. Treatment most often involves lifestyle changes. You may also need medicines. Your healthcare provider will tell you which will work best for you. Follow the advice below to help avoid this problem in the future.  Lifestyle changes  These lifestyle changes can help prevent constipation:    Diet. Eat a high-fiber diet, with fresh fruit and vegetables, and reduce dairy intake, meats, and processed  foods    Fluids. It's important to get enough fluids each day. Drink plenty of water when you eat more fiber. If you are on diet that limits the amount of fluid you can have, talk about this with your healthcare provider.    Regular exercise. Check with your healthcare provider first.  Medicines  Take any medicines as directed. Some laxatives are safe to use only every now and then. Others can be taken on a regular basis. While laxatives don't cause bowel dependence, they are treating the symptoms. So your constipation may return if you don't make other changes. Talk with your healthcare provider or pharmacist if you have questions.  Prescription pain medicines can cause constipation. If you are taking this kind of medicine, ask your healthcare provider if you should also take a stool softener.  Medicines you may take to treat constipation include:    Fiber supplements    Stool softeners    Laxatives    Enemas    Rectal suppositories  Follow-up care  Follow up with your healthcare provider if symptoms don't get better in the next few days. You may need to have more tests or see a specialist.  Call 911  Call 911 if any of these occur:    Trouble breathing    Stiff, rigid abdomen that is severely painful to touch    Confusion    Fainting or loss of consciousness    Rapid heart rate    Chest pain  When to seek medical advice  Call your healthcare provider right away if any of these occur:    Fever of 100.4 F (38 C) or higher, or as directed by your healthcare provider    Failure to resume normal bowel movements    Pain in your abdomen or back gets worse    Nausea or vomiting    Swelling in your abdomen    Blood in the stool    Black, tarry stool    Involuntary weight loss    Weakness  Date Last Reviewed: 6/1/2018 2000-2019 [employer]. [address] [place] [address], [place] [address]. All rights reserved. This information is not intended as a substitute for professional medical care. Always follow your  healthcare professional's instructions.               No follow-ups on file.    Jonathan Ta PA-C  [facility]

## 2020-09-25 NOTE — PATIENT INSTRUCTIONS
Take miralax twice a day for 1 week. Then decrease to once a day.    Follow-up if not improving in 2 weeks or sooner if worsening.    You can take Tylenol or ibuprofen as needed for pain.        Patient Education     Constipation (Adult)  Constipation means that you have bowel movements that are less frequent than usual. Stools often become very hard and difficult to pass.  Constipation is very common. At some point in life, it affects almost everyone. Since everyone's bowel habits are different, what is constipation to one person may not be to another. Your healthcare provider may do tests to diagnose constipation. It depends on what he or she finds when evaluating you.    Symptoms of constipation include:    Abdominal pain    Bloating    Vomiting    Painful bowel movements    Itching, swelling, bleeding, or pain around the anus  Causes  Constipation can have many causes. These include:    Diet low in fiber    Too much dairy    Not drinking enough liquids    Lack of exercise or physical activity (especially true for older adults)    Changes in lifestyle or daily routine, including pregnancy, aging, work, and travel    Frequent use or misuse of laxatives    Ignoring the urge to have a bowel movement or delaying it until later    Medicines, such as certain prescription pain medicines, iron supplements, antacids, certain antidepressants, and calcium supplements    Diseases like irritable bowel syndrome, bowel obstructions, stroke, diabetes, thyroid disease, Parkinson disease, hemorrhoids, and colon cancer  Complications  Potential complications of constipation can include:    Hemorrhoids    Rectal bleeding from hemorrhoids or anal fissures (skin tears)    Hernias    Dependency on laxatives    Chronic constipation    Fecal impaction, a severe form of constipation in which a large amount of hard stool is in your rectum that you can't pass    Bowel obstruction or perforation  Home care  All treatment should be done after  talking with your healthcare provider. This is especially true if you have another medical problems, are taking prescription medicines, or are an older adult. Treatment most often involves lifestyle changes. You may also need medicines. Your healthcare provider will tell you which will work best for you. Follow the advice below to help avoid this problem in the future.  Lifestyle changes  These lifestyle changes can help prevent constipation:    Diet. Eat a high-fiber diet, with fresh fruit and vegetables, and reduce dairy intake, meats, and processed foods    Fluids. It's important to get enough fluids each day. Drink plenty of water when you eat more fiber. If you are on diet that limits the amount of fluid you can have, talk about this with your healthcare provider.    Regular exercise. Check with your healthcare provider first.  Medicines  Take any medicines as directed. Some laxatives are safe to use only every now and then. Others can be taken on a regular basis. While laxatives don't cause bowel dependence, they are treating the symptoms. So your constipation may return if you don't make other changes. Talk with your healthcare provider or pharmacist if you have questions.  Prescription pain medicines can cause constipation. If you are taking this kind of medicine, ask your healthcare provider if you should also take a stool softener.  Medicines you may take to treat constipation include:    Fiber supplements    Stool softeners    Laxatives    Enemas    Rectal suppositories  Follow-up care  Follow up with your healthcare provider if symptoms don't get better in the next few days. You may need to have more tests or see a specialist.  Call 911  Call 911 if any of these occur:    Trouble breathing    Stiff, rigid abdomen that is severely painful to touch    Confusion    Fainting or loss of consciousness    Rapid heart rate    Chest pain  When to seek medical advice  Call your healthcare provider right away if  any of these occur:    Fever of 100.4 F (38 C) or higher, or as directed by your healthcare provider    Failure to resume normal bowel movements    Pain in your abdomen or back gets worse    Nausea or vomiting    Swelling in your abdomen    Blood in the stool    Black, tarry stool    Involuntary weight loss    Weakness  Date Last Reviewed: 6/1/2018 2000-2019 [employer]. [address] [place] [address]. All rights reserved. This information is not intended as a substitute for professional medical care. Always follow your healthcare professional's instructions.

## 2020-12-14 ENCOUNTER — HEALTH MAINTENANCE LETTER (OUTPATIENT)
Age: 40
End: 2020-12-14

## 2021-03-31 ENCOUNTER — OFFICE VISIT (OUTPATIENT)
Dept: PEDIATRICS | Facility: CLINIC | Age: 41
End: 2021-03-31
Payer: COMMERCIAL

## 2021-03-31 VITALS
BODY MASS INDEX: 34.83 KG/M2 | HEART RATE: 83 BPM | SYSTOLIC BLOOD PRESSURE: 134 MMHG | HEIGHT: 69 IN | DIASTOLIC BLOOD PRESSURE: 80 MMHG | OXYGEN SATURATION: 100 % | WEIGHT: 235.2 LBS | TEMPERATURE: 97.8 F | RESPIRATION RATE: 18 BRPM

## 2021-03-31 DIAGNOSIS — M54.41 ACUTE RIGHT-SIDED LOW BACK PAIN WITH RIGHT-SIDED SCIATICA: ICD-10-CM

## 2021-03-31 DIAGNOSIS — Z00.00 ROUTINE GENERAL MEDICAL EXAMINATION AT A HEALTH CARE FACILITY: Primary | ICD-10-CM

## 2021-03-31 DIAGNOSIS — Z11.59 NEED FOR HEPATITIS C SCREENING TEST: ICD-10-CM

## 2021-03-31 DIAGNOSIS — Z11.4 SCREENING FOR HIV (HUMAN IMMUNODEFICIENCY VIRUS): ICD-10-CM

## 2021-03-31 PROCEDURE — 99396 PREV VISIT EST AGE 40-64: CPT | Performed by: NURSE PRACTITIONER

## 2021-03-31 SDOH — HEALTH STABILITY: MENTAL HEALTH: HOW OFTEN DO YOU HAVE SIX OR MORE DRINKS ON ONE OCCASION?: NOT ASKED

## 2021-03-31 SDOH — HEALTH STABILITY: MENTAL HEALTH: HOW MANY DRINKS CONTAINING ALCOHOL DO YOU HAVE ON A TYPICAL DAY WHEN YOU ARE DRINKING?: 1 OR 2

## 2021-03-31 SDOH — HEALTH STABILITY: MENTAL HEALTH: HOW OFTEN DO YOU HAVE A DRINK CONTAINING ALCOHOL?: 2-3 TIMES A WEEK

## 2021-03-31 ASSESSMENT — ENCOUNTER SYMPTOMS
DIARRHEA: 0
HEMATURIA: 0
ABDOMINAL PAIN: 0
HEADACHES: 0
EYE PAIN: 0
SORE THROAT: 0
DIZZINESS: 0
HEMATOCHEZIA: 0
DYSURIA: 0
COUGH: 0
SHORTNESS OF BREATH: 0
WEAKNESS: 0
HEARTBURN: 0
FEVER: 0
CONSTIPATION: 0
MYALGIAS: 0
CHILLS: 0
PALPITATIONS: 0
PARESTHESIAS: 0
ARTHRALGIAS: 0
NAUSEA: 0
JOINT SWELLING: 0
FREQUENCY: 0
NERVOUS/ANXIOUS: 0

## 2021-03-31 ASSESSMENT — ANXIETY QUESTIONNAIRES
2. NOT BEING ABLE TO STOP OR CONTROL WORRYING: NOT AT ALL
7. FEELING AFRAID AS IF SOMETHING AWFUL MIGHT HAPPEN: NOT AT ALL
3. WORRYING TOO MUCH ABOUT DIFFERENT THINGS: NOT AT ALL
GAD7 TOTAL SCORE: 0
4. TROUBLE RELAXING: NOT AT ALL
1. FEELING NERVOUS, ANXIOUS, OR ON EDGE: NOT AT ALL
7. FEELING AFRAID AS IF SOMETHING AWFUL MIGHT HAPPEN: NOT AT ALL
6. BECOMING EASILY ANNOYED OR IRRITABLE: NOT AT ALL
GAD7 TOTAL SCORE: 0
5. BEING SO RESTLESS THAT IT IS HARD TO SIT STILL: NOT AT ALL
GAD7 TOTAL SCORE: 0

## 2021-03-31 ASSESSMENT — PATIENT HEALTH QUESTIONNAIRE - PHQ9
SUM OF ALL RESPONSES TO PHQ QUESTIONS 1-9: 1
10. IF YOU CHECKED OFF ANY PROBLEMS, HOW DIFFICULT HAVE THESE PROBLEMS MADE IT FOR YOU TO DO YOUR WORK, TAKE CARE OF THINGS AT HOME, OR GET ALONG WITH OTHER PEOPLE: NOT DIFFICULT AT ALL
SUM OF ALL RESPONSES TO PHQ QUESTIONS 1-9: 1

## 2021-03-31 ASSESSMENT — MIFFLIN-ST. JEOR: SCORE: 1974.98

## 2021-03-31 NOTE — PROGRESS NOTES
SUBJECTIVE:   CC: Jose Luis Mazariegos is an 40 year old male who presents for preventative health visit.     Patient has been advised of split billing requirements and indicates understanding: Yes     Healthy Habits:     Getting at least 3 servings of Calcium per day:  Yes    Bi-annual eye exam:  NO    Dental care twice a year:  NO    Sleep apnea or symptoms of sleep apnea:  None    Diet:  Regular (no restrictions)    Frequency of exercise:  2-3 days/week    Duration of exercise:  15-30 minutes    Taking medications regularly:  Not Applicable    Medication side effects:  Not applicable    PHQ-2 Total Score: 0    Additional concerns today:  No    Reports a 1.5 week history of acute on chronic right sided low back pain without sciatica. Pain has been progressively improving since onset. Seems to be worse in the morning and improves throughout the day as he increases his physical activity. Denies specific injury or known trauma.       Today's PHQ-2 Score:   PHQ-2 (  [employer]) 2020   Q1: Little interest or pleasure in doing things 0   Q2: Feeling down, depressed or hopeless 0   PHQ-2 Score 0   Q1: Little interest or pleasure in doing things -   Q2: Feeling down, depressed or hopeless -   PHQ-2 Score -     PHQ 3/31/2021   PHQ-9 Total Score 1   Q9: Thoughts of better off dead/self-harm past 2 weeks Not at all     ANDER-7 SCORE 3/31/2021   Total Score 0 (minimal anxiety)   Total Score 0         Abuse: Current or Past(Physical, Sexual or Emotional)- No  Do you feel safe in your environment? Yes      Social History     Tobacco Use     Smoking status: Former Smoker     Types: Cigarettes     Quit date: 2000     Years since quittin.7     Smokeless tobacco: Never Used   Substance Use Topics     Alcohol use: Yes     Comment: rarely         Alcohol Use 2019   Prescreen: >3 drinks/day or >7 drinks/week? No       Last PSA: No results found for: PSA    Reviewed orders with patient. Reviewed health maintenance and  updated orders accordingly - Yes  BP Readings from Last 3 Encounters:   21 134/80   20 128/76   20 126/74    Wt Readings from Last 3 Encounters:   21 106.7 kg (235 lb 3.2 oz)   20 106.4 kg (234 lb 9 oz)   20 108.9 kg (240 lb)             Reviewed and updated as needed this visit by clinical staff                 Reviewed and updated as needed this visit by Provider                Past Medical History:   Diagnosis Date     Perianal fistula      Past Surgical History:   Procedure Laterality Date     rectal fistula       Family History   Problem Relation Age of Onset     Skin Cancer Mother         Not sure what kind     No Known Problems Father      Depression Sister      Anxiety Disorder Sister      Dementia Maternal Grandmother      Cancer Maternal Grandfather      Skin Cancer Sister         Not sure what kind     Myocardial Infarction Paternal Grandfather      Myocardial Infarction Paternal Uncle      Cancer Paternal Uncle      Colon Cancer No family hx of      Prostate Cancer No family hx of      Diabetes No family hx of      Hypertension No family hx of      Social History     Socioeconomic History     Marital status:      Spouse name: Not on file     Number of children: Not on file     Years of education: Not on file     Highest education level: Not on file   Occupational History     Not on file   Social Needs     Financial resource strain: Not on file     Food insecurity     Worry: Not on file     Inability: Not on file     Transportation needs     Medical: Not on file     Non-medical: Not on file   Tobacco Use     Smoking status: Former Smoker     Packs/day: 1.00     Years: 2.00     Pack years: 2.00     Types: Cigarettes     Start date: 1998     Quit date: 2000     Years since quittin.7     Smokeless tobacco: Never Used   Substance and Sexual Activity     Alcohol use: Yes     Frequency: 2-3 times a week     Drinks per session: 1 or 2     Comment:  Not regularly, more socially.     Drug use: No     Sexual activity: Yes     Partners: Female     Birth control/protection: None   Lifestyle     Physical activity     Days per week: Not on file     Minutes per session: Not on file     Stress: Not on file   Relationships     Social connections     Talks on phone: Not on file     Gets together: Not on file     Attends [employer] service: Not on file     Active member of club or organization: Not on file     Attends meetings of clubs or organizations: Not on file     Relationship status: Not on file     Intimate partner violence     Fear of current or ex partner: Not on file     Emotionally abused: Not on file     Physically abused: Not on file     Forced sexual activity: Not on file   Other Topics Concern     Parent/sibling w/ CABG, MI or angioplasty before 65F 55M? 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 In his free time, enjoys golfing and fishing, camping, hiking. Getting  in June.         Mela Valentino, DNP, APRN, CNP    3/31/2021     No current outpatient medications on file.     No current facility-administered medications for this visit.       No Known Allergies      Review of Systems   Constitutional: Negative for chills and fever.   HENT: Negative for congestion, ear pain, hearing loss and sore throat.    Eyes: Negative for pain and visual disturbance.   Respiratory: Negative for cough and shortness of breath.    Cardiovascular: Negative for chest pain, palpitations and peripheral edema.   Gastrointestinal: Negative for abdominal pain, constipation, diarrhea, heartburn, hematochezia and nausea.   Genitourinary: Negative for discharge, dysuria, frequency, genital sores, hematuria, impotence and urgency.   Musculoskeletal: Negative for arthralgias, joint swelling and myalgias.   Skin: Negative for rash.   Neurological: Negative for dizziness, weakness, headaches and paresthesias.   Psychiatric/Behavioral:  "Negative for mood changes. The patient is not nervous/anxious.        OBJECTIVE:   /80 (BP Location: Right arm, Patient Position: Sitting, Cuff Size: Adult Large)   Pulse 83   Temp 97.8  F (36.6  C) (Tympanic)   Resp 18   Ht 1.765 m (5' 9.49\")   Wt 106.7 kg (235 lb 3.2 oz)   SpO2 100%   BMI 34.25 kg/m      Physical Exam  Constitutional: appears to be in no acute distress, comfortable, pleasant.   Eyes: anicteric, conjunctiva clear without drainage or erythema. RAQUEL.   Ears, Nose and Throat: tympanic membranes gray with LR,  nose without nasal discharge. OP: no erythema to posterior pharynx, negative post nasal drainage, tonsils +1 no erythema or exudate.  Neck: supple, thyroid palpable,not enlarged, no nodules   Cardiovascular: regular rate and rhythm, normal S1 and S2, no murmurs, rubs or gallops, peripheral pulses full and symmetric; negative peripheral edema   Respiratory: Air entry throughout. Breathing pattern unlabored without the use of accessory muscles. Clear to auscultation A and P, no wheezes or crackles, normal breath sounds.    Gastrointestinal: rounded, soft. Positive bowel sounds x4, nontender, no masses.   Musculoskeletal: full range of motion, no edema.   Skin: pink, turgor smooth and elastic. Negative for lesions or dryness.  Neurological: normal gait, no tremor.   Psychological: appropriate mood and affect.   Lymphatic: no cervical, axillary, supraclavicular, or infraclavicular lymphadenopathy.    Diagnostic Test Results:  Labs reviewed in Epic    ASSESSMENT/PLAN:   1. Routine general medical examination at a health care facility  Age appropriate screening and preventative care have been addressed today. Vaccinations have been reviewed. Patient has been advised to undertake routine aerobic activity and they were counseled on healthy weight maintenance. Recommend annual vision exams as well as biannual dental exams. They will follow up for annual physical again in one year.   Plan:   - " "Lipid panel reflex to direct LDL Fasting; Future   - Comprehensive metabolic panel (BMP + Alb, Alk Phos, ALT, AST, Total. Bili, TP); Future    2. Screening for HIV (human immunodeficiency virus)  Pt agreeable to recommended one time routine screening for HIV.   Plan:   - HIV Antigen Antibody Combo; Future    3. Need for hepatitis C screening test  Pt agreeable to recommended one time routine screening for Hepatitis C.  Plan:   - Hepatitis C Screen Reflex to HCV RNA Quant and Genotype; Future    4. Acute right-sided low back pain with right-sided sciatica  Reports a 1.5 week history of acute on chronic right sided low back pain without sciatica. Pain has been progressively improving since onset. Seems to be worse in the morning and improves throughout the day as he increases his physical activity. Denies specific injury or known trauma. Reassuring that pain has been improving since onset. Recommend watchful waiting and conservative measures including activity as tolerated and NSAIDs as needed. If no improvement over the next 2 weeks, will consider referral to PT.     5. BMI 34.0-34.9,adult  Body mass index is 34.25 kg/m .       Follow-up: Lab results pending, will follow-up as indicated after reviewing results.     COUNSELING:   Reviewed preventive health counseling, as reflected in patient instructions  Special attention given to:        Regular exercise       Healthy diet/nutrition       Vision screening       Consider Hep C screening for all patients one time for ages 18-79 years       HIV screeninx in teen years, 1x in adult years, and at intervals if high risk       Colon cancer screening       Prostate cancer screening    Estimated body mass index is 34.25 kg/m  as calculated from the following:    Height as of this encounter: 1.765 m (5' 9.49\").    Weight as of this encounter: 106.7 kg (235 lb 3.2 oz).     Weight management plan: Discussed healthy diet and exercise guidelines    He reports that he quit " smoking about 20 years ago. His smoking use included cigarettes. He started smoking about 22 years ago. He has a 2.00 pack-year smoking history. He has never used smokeless tobacco.      Counseling Resources:  ATP IV Guidelines  Pooled Cohorts Equation Calculator  FRAX Risk Assessment  ICSI Preventive Guidelines  Dietary Guidelines for Americans, 2010  USDA's MyPlate  ASA Prophylaxis  Lung CA Screening    TIA Tate CNP  [facility]

## 2021-04-01 ASSESSMENT — ANXIETY QUESTIONNAIRES: GAD7 TOTAL SCORE: 0

## 2021-04-01 ASSESSMENT — PATIENT HEALTH QUESTIONNAIRE - PHQ9: SUM OF ALL RESPONSES TO PHQ QUESTIONS 1-9: 1

## 2021-04-06 DIAGNOSIS — Z11.59 NEED FOR HEPATITIS C SCREENING TEST: ICD-10-CM

## 2021-04-06 DIAGNOSIS — Z00.00 ROUTINE GENERAL MEDICAL EXAMINATION AT A HEALTH CARE FACILITY: ICD-10-CM

## 2021-04-06 DIAGNOSIS — Z11.4 SCREENING FOR HIV (HUMAN IMMUNODEFICIENCY VIRUS): ICD-10-CM

## 2021-04-06 LAB
ALBUMIN SERPL-MCNC: 4.1 G/DL (ref 3.4–5)
ALP SERPL-CCNC: 72 U/L (ref 40–150)
ALT SERPL W P-5'-P-CCNC: 26 U/L (ref 0–70)
ANION GAP SERPL CALCULATED.3IONS-SCNC: 6 MMOL/L (ref 3–14)
AST SERPL W P-5'-P-CCNC: 13 U/L (ref 0–45)
BILIRUB SERPL-MCNC: 0.4 MG/DL (ref 0.2–1.3)
BUN SERPL-MCNC: 12 MG/DL (ref 7–30)
CALCIUM SERPL-MCNC: 9.6 MG/DL (ref 8.5–10.1)
CHLORIDE SERPL-SCNC: 108 MMOL/L (ref 94–109)
CHOLEST SERPL-MCNC: 229 MG/DL
CO2 SERPL-SCNC: 24 MMOL/L (ref 20–32)
CREAT SERPL-MCNC: 0.87 MG/DL (ref 0.66–1.25)
GFR SERPL CREATININE-BSD FRML MDRD: >90 ML/MIN/{1.73_M2}
GLUCOSE SERPL-MCNC: 90 MG/DL (ref 70–99)
HCV AB SERPL QL IA: NONREACTIVE
HDLC SERPL-MCNC: 60 MG/DL
HIV 1+2 AB+HIV1 P24 AG SERPL QL IA: NONREACTIVE
LDLC SERPL CALC-MCNC: 151 MG/DL
NONHDLC SERPL-MCNC: 169 MG/DL
POTASSIUM SERPL-SCNC: 4 MMOL/L (ref 3.4–5.3)
PROT SERPL-MCNC: 7.9 G/DL (ref 6.8–8.8)
SODIUM SERPL-SCNC: 138 MMOL/L (ref 133–144)
TRIGL SERPL-MCNC: 92 MG/DL

## 2021-04-06 PROCEDURE — 86803 HEPATITIS C AB TEST: CPT | Performed by: NURSE PRACTITIONER

## 2021-04-06 PROCEDURE — 80053 COMPREHEN METABOLIC PANEL: CPT | Performed by: NURSE PRACTITIONER

## 2021-04-06 PROCEDURE — 87389 HIV-1 AG W/HIV-1&-2 AB AG IA: CPT | Performed by: NURSE PRACTITIONER

## 2021-04-06 PROCEDURE — 36415 COLL VENOUS BLD VENIPUNCTURE: CPT | Performed by: NURSE PRACTITIONER

## 2021-04-06 PROCEDURE — 80061 LIPID PANEL: CPT | Performed by: NURSE PRACTITIONER

## 2021-04-06 NOTE — RESULT ENCOUNTER NOTE
"Dear Godwin,     I am writing in regards to your recent lab results.     HIV and Hep C screens came back negative.     Metabolic panel showed normal liver and kidney function as well as normal electrolytes, including normal blood sugar.     Your cholesterol is elevated. Specifically, the LDL (commonly referred to as \"bad cholesterol\") was 151. Ideally , we like to keep LDL <100. I recommend attempts to lower your LDL cholesterol through lifestyle changes at this time (below).     Focus on a diet emphasizing intake of vegetables, fruits, legumes, nuts, whole grains, and fish. Minimize intake of trans fats, processed meats, processed carbohydrates, and sweetened beverages. Trans fats, sometimes listed on food labels as \"partially hydrogenated vegetable oil,\" are often used in margarines and store-bought cookies, crackers and cakes. Trans fats raise overall cholesterol levels. Eat foods rich in omega-3 fatty acids. Omega-3 fatty acids don't affect LDL cholesterol but they have other heart-healthy benefits, including reducing blood pressure. Foods with omega-3 fatty acids include salmon, mackerel, herring, walnuts and flaxseeds. Increase soluble fiber. Soluble fiber can reduce the absorption of cholesterol into your bloodstream. Soluble fiber is found in such foods as oatmeal, kidney beans, [place] sprouts, apples and pears.    If you drink alcohol, do so in moderation. For healthy adults, that means up to one drink a day for women of all ages and men older than age 65, and up to two drinks a day for men age 65 and younger.    Optimize a physically active lifestyle. Engage in at least 150 minutes per week of accumulated moderate intensity or 75 minutes per week of vigorous intensity aerobic physical activity. Decrease sedentary behavior. Moderate intensity exercise would include activities like brisk walking (2.4-4 mph), biking (5-9 mph), ballroom dancing, active yoga, recreational swimming. Vigorous exercise would " include things like jogging/running, biking >10 mph, singles tennis, swimming laps.     Please don't hesitate to reach out with any questions or concerns regarding these results and recommendations.     Mela Odell, DNP, APRN, CNP

## 2021-08-12 ENCOUNTER — IMMUNIZATION (OUTPATIENT)
Dept: NURSING | Facility: CLINIC | Age: 41
End: 2021-08-12
Payer: COMMERCIAL

## 2021-08-12 PROCEDURE — 91300 PR COVID VAC PFIZER DIL RECON 30 MCG/0.3 ML IM: CPT

## 2021-08-12 PROCEDURE — 0001A PR COVID VAC PFIZER DIL RECON 30 MCG/0.3 ML IM: CPT

## 2021-09-02 ENCOUNTER — IMMUNIZATION (OUTPATIENT)
Dept: NURSING | Facility: CLINIC | Age: 41
End: 2021-09-02
Attending: PEDIATRICS
Payer: COMMERCIAL

## 2021-09-02 PROCEDURE — 0002A PR COVID VAC PFIZER DIL RECON 30 MCG/0.3 ML IM: CPT

## 2021-09-02 PROCEDURE — 91300 PR COVID VAC PFIZER DIL RECON 30 MCG/0.3 ML IM: CPT

## 2021-10-02 ENCOUNTER — HEALTH MAINTENANCE LETTER (OUTPATIENT)
Age: 41
End: 2021-10-02

## 2022-05-14 ENCOUNTER — HEALTH MAINTENANCE LETTER (OUTPATIENT)
Age: 42
End: 2022-05-14

## 2022-08-10 ENCOUNTER — OFFICE VISIT (OUTPATIENT)
Dept: PEDIATRICS | Facility: CLINIC | Age: 42
End: 2022-08-10
Payer: COMMERCIAL

## 2022-08-10 VITALS
TEMPERATURE: 97.3 F | BODY MASS INDEX: 32.5 KG/M2 | WEIGHT: 227 LBS | DIASTOLIC BLOOD PRESSURE: 62 MMHG | SYSTOLIC BLOOD PRESSURE: 112 MMHG | HEART RATE: 65 BPM | OXYGEN SATURATION: 99 % | HEIGHT: 70 IN | RESPIRATION RATE: 16 BRPM

## 2022-08-10 DIAGNOSIS — E66.811 CLASS 1 OBESITY DUE TO EXCESS CALORIES WITHOUT SERIOUS COMORBIDITY WITH BODY MASS INDEX (BMI) OF 33.0 TO 33.9 IN ADULT: ICD-10-CM

## 2022-08-10 DIAGNOSIS — L98.9 SKIN LESION: ICD-10-CM

## 2022-08-10 DIAGNOSIS — Z00.00 ROUTINE GENERAL MEDICAL EXAMINATION AT A HEALTH CARE FACILITY: Primary | ICD-10-CM

## 2022-08-10 DIAGNOSIS — E66.09 CLASS 1 OBESITY DUE TO EXCESS CALORIES WITHOUT SERIOUS COMORBIDITY WITH BODY MASS INDEX (BMI) OF 33.0 TO 33.9 IN ADULT: ICD-10-CM

## 2022-08-10 DIAGNOSIS — Z30.2 ENCOUNTER FOR VASECTOMY: ICD-10-CM

## 2022-08-10 PROCEDURE — 36415 COLL VENOUS BLD VENIPUNCTURE: CPT | Performed by: NURSE PRACTITIONER

## 2022-08-10 PROCEDURE — 99396 PREV VISIT EST AGE 40-64: CPT | Performed by: NURSE PRACTITIONER

## 2022-08-10 PROCEDURE — 80061 LIPID PANEL: CPT | Performed by: NURSE PRACTITIONER

## 2022-08-10 PROCEDURE — 82947 ASSAY GLUCOSE BLOOD QUANT: CPT | Performed by: NURSE PRACTITIONER

## 2022-08-10 SDOH — ECONOMIC STABILITY: TRANSPORTATION INSECURITY
IN THE PAST 12 MONTHS, HAS LACK OF TRANSPORTATION KEPT YOU FROM MEETINGS, WORK, OR FROM GETTING THINGS NEEDED FOR DAILY LIVING?: NO

## 2022-08-10 SDOH — ECONOMIC STABILITY: INCOME INSECURITY: IN THE LAST 12 MONTHS, WAS THERE A TIME WHEN YOU WERE NOT ABLE TO PAY THE MORTGAGE OR RENT ON TIME?: NO

## 2022-08-10 SDOH — ECONOMIC STABILITY: INCOME INSECURITY: HOW HARD IS IT FOR YOU TO PAY FOR THE VERY BASICS LIKE FOOD, HOUSING, MEDICAL CARE, AND HEATING?: NOT HARD AT ALL

## 2022-08-10 SDOH — HEALTH STABILITY: PHYSICAL HEALTH: ON AVERAGE, HOW MANY MINUTES DO YOU ENGAGE IN EXERCISE AT THIS LEVEL?: 40 MIN

## 2022-08-10 SDOH — ECONOMIC STABILITY: TRANSPORTATION INSECURITY
IN THE PAST 12 MONTHS, HAS THE LACK OF TRANSPORTATION KEPT YOU FROM MEDICAL APPOINTMENTS OR FROM GETTING MEDICATIONS?: NO

## 2022-08-10 SDOH — ECONOMIC STABILITY: FOOD INSECURITY: WITHIN THE PAST 12 MONTHS, YOU WORRIED THAT YOUR FOOD WOULD RUN OUT BEFORE YOU GOT MONEY TO BUY MORE.: NEVER TRUE

## 2022-08-10 SDOH — HEALTH STABILITY: PHYSICAL HEALTH: ON AVERAGE, HOW MANY DAYS PER WEEK DO YOU ENGAGE IN MODERATE TO STRENUOUS EXERCISE (LIKE A BRISK WALK)?: 6 DAYS

## 2022-08-10 SDOH — ECONOMIC STABILITY: FOOD INSECURITY: WITHIN THE PAST 12 MONTHS, THE FOOD YOU BOUGHT JUST DIDN'T LAST AND YOU DIDN'T HAVE MONEY TO GET MORE.: NEVER TRUE

## 2022-08-10 ASSESSMENT — ENCOUNTER SYMPTOMS
DIARRHEA: 0
HEARTBURN: 0
ARTHRALGIAS: 0
COUGH: 0
HEADACHES: 0
PALPITATIONS: 0
MYALGIAS: 0
ABDOMINAL PAIN: 0
WEAKNESS: 0
PARESTHESIAS: 0
SHORTNESS OF BREATH: 0
DYSURIA: 0
FREQUENCY: 0
JOINT SWELLING: 0
EYE PAIN: 0
NAUSEA: 0
FEVER: 0
HEMATOCHEZIA: 0
CONSTIPATION: 0
HEMATURIA: 0
DIZZINESS: 0
CHILLS: 0
NERVOUS/ANXIOUS: 0
SORE THROAT: 0

## 2022-08-10 ASSESSMENT — SOCIAL DETERMINANTS OF HEALTH (SDOH)
HOW OFTEN DO YOU GET TOGETHER WITH FRIENDS OR RELATIVES?: THREE TIMES A WEEK
IN A TYPICAL WEEK, HOW MANY TIMES DO YOU TALK ON THE PHONE WITH FAMILY, FRIENDS, OR NEIGHBORS?: MORE THAN THREE TIMES A WEEK
DO YOU BELONG TO ANY CLUBS OR ORGANIZATIONS SUCH AS CHURCH GROUPS UNIONS, FRATERNAL OR ATHLETIC GROUPS, OR SCHOOL GROUPS?: NO
HOW OFTEN DO YOU ATTEND CHURCH OR RELIGIOUS SERVICES?: 1 TO 4 TIMES PER YEAR

## 2022-08-10 ASSESSMENT — LIFESTYLE VARIABLES
SKIP TO QUESTIONS 9-10: 1
HOW OFTEN DO YOU HAVE SIX OR MORE DRINKS ON ONE OCCASION: NEVER
AUDIT-C TOTAL SCORE: 2
HOW OFTEN DO YOU HAVE A DRINK CONTAINING ALCOHOL: 2-4 TIMES A MONTH
HOW MANY STANDARD DRINKS CONTAINING ALCOHOL DO YOU HAVE ON A TYPICAL DAY: 1 OR 2

## 2022-08-10 ASSESSMENT — PAIN SCALES - GENERAL: PAINLEVEL: NO PAIN (0)

## 2022-08-10 NOTE — PROGRESS NOTES
SUBJECTIVE:   CC: Jose Luis Mazariegos is an 41 year old male who presents for preventative health visit.     Patient has been advised of split billing requirements and indicates understanding: Yes     Healthy Habits:     Getting at least 3 servings of Calcium per day:  Yes    Bi-annual eye exam:  NO    Dental care twice a year:  NO    Sleep apnea or symptoms of sleep apnea:  None    Diet:  Regular (no restrictions)    Frequency of exercise:  2-3 days/week    Duration of exercise:  15-30 minutes    Taking medications regularly:  Not Applicable    Medication side effects:  Not applicable    PHQ-2 Total Score: 0    Additional concerns today:  Yes      Would like referral for vasectomy.       Today's PHQ-2 Score:   PHQ-2 (  [employer]) 8/10/2022   Q1: Little interest or pleasure in doing things 0   Q2: Feeling down, depressed or hopeless 0   PHQ-2 Score 0   PHQ-2 Total Score (12-17 Years)- Positive if 3 or more points; Administer PHQ-A if positive -   Q1: Little interest or pleasure in doing things Not at all   Q2: Feeling down, depressed or hopeless Not at all   PHQ-2 Score 0       Abuse: Current or Past(Physical, Sexual or Emotional)- No  Do you feel safe in your environment? Yes    Have you ever done Advance Care Planning? (For example, a Health Directive, POLST, or a discussion with a medical provider or your loved ones about your wishes): No, advance care planning information given to patient to review.  Patient plans to discuss their wishes with loved ones or provider.      Social History     Tobacco Use     Smoking status: Former Smoker     Packs/day: 1.00     Years: 2.00     Pack years: 2.00     Types: Cigarettes     Start date: 1998     Quit date: 2000     Years since quittin.0     Smokeless tobacco: Never Used   Substance Use Topics     Alcohol use: Yes     Comment: Not regularly, more socially.         Alcohol Use 8/10/2022   Prescreen: >3 drinks/day or >7 drinks/week? No       Last PSA: No results  found for: PSA    Reviewed orders with patient. Reviewed health maintenance and updated orders accordingly - Yes  BP Readings from Last 3 Encounters:   08/10/22 112/62   21 134/80   20 128/76    Wt Readings from Last 3 Encounters:   08/10/22 103 kg (227 lb)   21 106.7 kg (235 lb 3.2 oz)   20 106.4 kg (234 lb 9 oz)            Reviewed and updated as needed this visit by clinical staff   Tobacco  Allergies                 Reviewed and updated as needed this visit by Provider                   Patient Active Problem List   Diagnosis     CARDIOVASCULAR SCREENING; LDL GOAL LESS THAN 160     Obesity, unspecified obesity severity, unspecified obesity type     Past Medical History:   Diagnosis Date     Perianal fistula      Past Surgical History:   Procedure Laterality Date     rectal fistula       Family History   Problem Relation Age of Onset     Skin Cancer Mother         Not sure what kind     No Known Problems Father      Depression Sister      Anxiety Disorder Sister      Dementia Maternal Grandmother      Cancer Maternal Grandfather      Skin Cancer Sister         Not sure what kind     Myocardial Infarction Paternal Grandfather      Myocardial Infarction Paternal Uncle      Cancer Paternal Uncle      Colon Cancer No family hx of      Prostate Cancer No family hx of      Diabetes No family hx of      Hypertension No family hx of      Social History     Socioeconomic History     Marital status:      Spouse name: Not on file     Number of children: Not on file     Years of education: Not on file     Highest education level: Not on file   Occupational History     Not on file   Tobacco Use     Smoking status: Former Smoker     Packs/day: 1.00     Years: 2.00     Pack years: 2.00     Types: Cigarettes     Start date: 1998     Quit date: 2000     Years since quittin.0     Smokeless tobacco: Never Used   Vaping Use     Vaping Use: Never used   Substance and Sexual Activity      Alcohol use: Yes     Comment: Not regularly, more socially.     Drug use: No     Sexual activity: Yes     Partners: Female     Birth control/protection: None   Other Topics Concern     Parent/sibling w/ CABG, MI or angioplasty before 65F 55M? 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 In his free time, enjoys golfing and fishing, camping, hiking.  in June 2021. Wife is now pregnant with baby girl, due 12/2022.        Has a 13 year old son.         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         Mela Valentino, DNP, APRN, CNP    08/10/22     Social Determinants of Health     Financial Resource Strain: Low Risk      Difficulty of Paying Living Expenses: Not hard at all   Food Insecurity: No Food Insecurity     Worried About Running Out of Food in the Last Year: Never true     Ran Out of Food in the Last Year: Never true   Transportation Needs: No Transportation Needs     Lack of Transportation (Medical): No     Lack of Transportation (Non-Medical): No   Physical Activity: Sufficiently Active     Days of Exercise per Week: 6 days     Minutes of Exercise per Session: 40 min   Stress: No Stress Concern Present     Feeling of Stress : Not at all   Social Connections: Moderately Integrated     Frequency of Communication with Friends and Family: More than three times a week     Frequency of Social Gatherings with Friends and Family: Three times a week     Attends Sikh Services: 1 to 4 times per year     Active Member of Clubs or Organizations: No     Attends Club or Organization Meetings: Not on file     Marital Status:    Intimate Partner Violence: Not on file   Housing Stability: Low Risk      Unable to Pay for Housing in the Last Year: No     Number of Places Lived in the Last Year: 2     Unstable Housing in the Last Year: No     No current outpatient medications on file.     No current facility-administered  "medications for this visit.      No Known Allergies      Review of Systems   Constitutional: Negative for chills and fever.   HENT: Negative for congestion, ear pain, hearing loss and sore throat.    Eyes: Negative for pain and visual disturbance.   Respiratory: Negative for cough and shortness of breath.    Cardiovascular: Negative for chest pain, palpitations and peripheral edema.   Gastrointestinal: Negative for abdominal pain, constipation, diarrhea, heartburn, hematochezia and nausea.   Genitourinary: Negative for dysuria, frequency, genital sores, hematuria, impotence, penile discharge and urgency.   Musculoskeletal: Negative for arthralgias, joint swelling and myalgias.   Skin: Negative for rash.   Neurological: Negative for dizziness, weakness, headaches and paresthesias.   Psychiatric/Behavioral: Negative for mood changes. The patient is not nervous/anxious.        OBJECTIVE:   /62   Pulse 65   Temp 97.3  F (36.3  C) (Tympanic)   Resp 16   Ht 1.765 m (5' 9.5\")   Wt 103 kg (227 lb)   SpO2 99%   BMI 33.04 kg/m      Physical Exam  Constitutional: appears to be in no acute distress, comfortable, pleasant.   Eyes: anicteric, conjunctiva clear without drainage or erythema. RAQUEL.   Ears, Nose and Throat: tympanic membranes gray with LR,  nose without nasal discharge. OP: no erythema to posterior pharynx, negative post nasal drainage, tonsils +1 no erythema or exudate.  Neck: supple, thyroid palpable,not enlarged, no nodules   Cardiovascular: regular rate and rhythm, normal S1 and S2, no murmurs, rubs or gallops, peripheral pulses full and symmetric; negative peripheral edema   Respiratory: Air entry throughout. Breathing pattern unlabored without the use of accessory muscles. Clear to auscultation A and P, no wheezes or crackles, normal breath sounds.    Gastrointestinal: rounded, soft. Positive bowel sounds x4, nontender, no masses.   Musculoskeletal: full range of motion, no edema.   Skin: pink, " "turgor smooth and elastic. Negative for dryness. Pink papule to back of neck.  Neurological: normal gait, no tremor.   Psychological: appropriate mood and affect.   Lymphatic: no cervical, axillary, supraclavicular, or infraclavicular lymphadenopathy.    Diagnostic Test Results:  Labs reviewed in Epic    ASSESSMENT/PLAN:   (Z00.00) Routine general medical examination at a health care facility  (primary encounter diagnosis)  Age appropriate screening and preventative care have been addressed today. Vaccinations have been reviewed and are up to date. Patient has been advised to undertake routine aerobic activity. Recommend annual vision exams as well as biannual dental exams. They will follow up for annual physical again in one year.   - Lipid panel reflex to direct LDL Fasting  - Glucose         (E66.09,  Z68.33) Class 1 obesity due to excess calories without serious comorbidity with body mass index (BMI) of 33.0 to 33.9 in adult  Body mass index is 33.04 kg/m .  - Lipid panel reflex to direct LDL Fasting  - Glucose         (Z30.2) Encounter for vasectomy  - Adult Urology  Referral    (L98.9) Skin lesion  Noticed a new lesion on his neck within the past week or so. No red flag signs, continue to monitor for now. If changing or persisting, would consider derm referral.            Follow-up: Lab results pending, will follow-up as indicated after reviewing results.       COUNSELING:   Reviewed preventive health counseling, as reflected in patient instructions  Special attention given to:        Regular exercise       Immunizations       Family planning    Estimated body mass index is 33.04 kg/m  as calculated from the following:    Height as of this encounter: 1.765 m (5' 9.5\").    Weight as of this encounter: 103 kg (227 lb).     Weight management plan: Discussed healthy diet and exercise guidelines    He reports that he quit smoking about 22 years ago. His smoking use included cigarettes. He started smoking " about 24 years ago. He has a 2.00 pack-year smoking history. He has never used smokeless tobacco.      Counseling Resources:  ATP IV Guidelines  Pooled Cohorts Equation Calculator  FRAX Risk Assessment  ICSI Preventive Guidelines  Dietary Guidelines for Americans, 2010  USDA's MyPlate  ASA Prophylaxis  Lung CA Screening    TIA Tate CNP [facility] JENNIFER

## 2022-08-11 LAB
CHOLEST SERPL-MCNC: 199 MG/DL
FASTING STATUS PATIENT QL REPORTED: YES
FASTING STATUS PATIENT QL REPORTED: YES
GLUCOSE BLD-MCNC: 87 MG/DL (ref 70–99)
HDLC SERPL-MCNC: 60 MG/DL
LDLC SERPL CALC-MCNC: 121 MG/DL
NONHDLC SERPL-MCNC: 139 MG/DL
TRIGL SERPL-MCNC: 88 MG/DL

## 2022-09-03 ENCOUNTER — HEALTH MAINTENANCE LETTER (OUTPATIENT)
Age: 42
End: 2022-09-03

## 2022-11-28 ENCOUNTER — TELEPHONE (OUTPATIENT)
Dept: PEDIATRICS | Facility: CLINIC | Age: 42
End: 2022-11-28

## 2022-11-28 NOTE — TELEPHONE ENCOUNTER
Reason for Call:  Other appointment    Detailed comments: The pt called because his wife is being induced today, she is giving birth and was seen for Prenatal care through [facility] [place]. They are wanting their child to be seen by one of our pediatric providers and he is wondering how he goes about doing this and getting his child an appointment. They would prefer a female provider.     Phone Number Patient can be reached at: Home number on file [phone] (home)    Best Time: Anytime    Can we leave a detailed message on this number? YES    Call taken on 11/28/2022 at 10:12 AM by Bernadine Menjivar CMA

## 2022-11-28 NOTE — TELEPHONE ENCOUNTER
Sent pt the following DTT message:    Good Morning Godwin,     We received your message about wanting your new baby to be seen by one of the Pediatricians at [facility] so I just wanted to reach out. I figured it'd be easiest to message you this way to list the names of female providers who are taking on  patients.      The following providers would be able to see your child and they have blocked appointment slots for newborns:     MD Idania Norman MD Alex Reif, Pediatric Nurse Practitioner   Lilly Schofield, Resident Physician who will be with our clinic for 3 years        Once the baby is born, the doctor at the hospital will tell you how soon they want you to get the  into the clinic and then you can call us back at [phone] to schedule with one of our providers.     Feel free to message back or call us if you need anything else.      Thanks!  Gina ROBERTSON Patient Services

## 2022-12-05 ENCOUNTER — VIRTUAL VISIT (OUTPATIENT)
Dept: UROLOGY | Facility: CLINIC | Age: 42
End: 2022-12-05
Payer: COMMERCIAL

## 2022-12-05 VITALS — WEIGHT: 230 LBS | HEIGHT: 69 IN | BODY MASS INDEX: 34.07 KG/M2

## 2022-12-05 DIAGNOSIS — Z30.2 ENCOUNTER FOR VASECTOMY: ICD-10-CM

## 2022-12-05 PROCEDURE — 99203 OFFICE O/P NEW LOW 30 MIN: CPT | Mod: 95 | Performed by: UROLOGY

## 2022-12-05 ASSESSMENT — PAIN SCALES - GENERAL: PAINLEVEL: NO PAIN (0)

## 2022-12-05 NOTE — LETTER
12/5/2022       RE: Jose Luis Mazariegos  [address] [facility] [place] [address]     Dear Colleague,    Thank you for referring your patient, Jose Luis Mazariegos, to the [facility] [place] [facility]. Please see a copy of my visit note below.    **PT WILL MEET FOR VISIT IN [facility] [place]**      Godwin is a 42 year old who is being evaluated via a billable video visit.      How would you like to obtain your AVS? [facility]     If the video visit is dropped, the invitation should be resent by: Text to cell phone: [phone]    Will anyone else be joining your video visit? No           VASECTOMY CONSULTATION NOTE  [facility]  [phone]  DATE OF VISIT: 12/5/2022    PATIENT NAME: Jose Luis Mazariegos    YOB: 1980      REASON FOR CONSULTATION: Mr. Jose Luis Mazariegos is a 42 year old year old gentleman who is being seen as a virtual visit in the urology clinic today requesting a vasectomy. He has 2 children and he wishes to have a vasectomy for birth control. He has no prior urologic history and has had no prior surgery on the testicles. He has no symptoms in the testicles.    PAST MEDICAL HISTORY:   Past Medical History:   Diagnosis Date     Perianal fistula        PAST SURGICAL HISTORY:   Past Surgical History:   Procedure Laterality Date     rectal fistula  01/01/2009       MEDICATIONS: No current outpatient medications on file.    ALLERGIES: No Known Allergies    FAMILY HISTORY:   Family History   Problem Relation Age of Onset     Skin Cancer Mother         Not sure what kind     No Known Problems Father      Depression Sister      Anxiety Disorder Sister      Dementia Maternal Grandmother      Cancer Maternal Grandfather      Skin Cancer Sister         Not sure what kind     Myocardial Infarction Paternal Grandfather      Myocardial Infarction Paternal Uncle      Cancer Paternal Uncle      Colon Cancer No family hx of      Prostate  Cancer No family hx of      Diabetes No family hx of      Hypertension No family hx of        SOCIAL HISTORY:   Social History     Socioeconomic History     Marital status:      Spouse name: Not on file     Number of children: Not on file     Years of education: Not on file     Highest education level: Not on file   Occupational History     Not on file   Tobacco Use     Smoking status: Former     Packs/day: 1.00     Years: 2.00     Pack years: 2.00     Types: Cigarettes     Start date: 1998     Quit date: 2000     Years since quittin.4     Smokeless tobacco: Never   Vaping Use     Vaping Use: Never used   Substance and Sexual Activity     Alcohol use: Yes     Comment: Not regularly, more socially.     Drug use: No     Sexual activity: Yes     Partners: Female     Birth control/protection: None   Other Topics Concern     Parent/sibling w/ CABG, MI or angioplasty before 65F 55M? 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  in 2021. Wife is now pregnant with baby girl, due 2022.        Has a 13 year old son.         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         Mela Valentino, DNP, APRN, CNP    08/10/22     Social Determinants of Health     Financial Resource Strain: Low Risk      Difficulty of Paying Living Expenses: Not hard at all   Food Insecurity: No Food Insecurity     Worried About Running Out of Food in the Last Year: Never true     Ran Out of Food in the Last Year: Never true   Transportation Needs: No Transportation Needs     Lack of Transportation (Medical): No     Lack of Transportation (Non-Medical): No   Physical Activity: Sufficiently Active     Days of Exercise per Week: 6 days     Minutes of Exercise per Session: 40 min   Stress: No Stress Concern Present     Feeling of Stress : Not at all   Social Connections:  "Moderately Integrated     Frequency of Communication with Friends and Family: More than three times a week     Frequency of Social Gatherings with Friends and Family: Three times a week     Attends Faith Services: 1 to 4 times per year     Active Member of Clubs or Organizations: No     Attends Club or Organization Meetings: Not on file     Marital Status:    Intimate Partner Violence: Not on file   Housing Stability: Low Risk      Unable to Pay for Housing in the Last Year: No     Number of Places Lived in the Last Year: 2     Unstable Housing in the Last Year: No       REVIEW OF SYSTEMS:  Skin: No rash, pruritis, or skin pigmentation  Eyes: No changes in vision  Ears/Nose/Throat: No changes in hearing, no nosebleeds  Respiratory: No shortness of breath, dyspnea on exertion, cough, or hemoptysis  Cardiovascular: No chest pain or palpitations  Gastrointestinal: No diarrhea or constipation. No abdominal pain. No hematochezia  Genitourinary: see HPI  Musculoskeletal: No pain or swelling of joints, normal range of motion  Neurologic: No weakness or tremors  Psychiatric: No recent changes in memory or mood  Hematologic/Lymphatic/Immunologic: No easy bruising or enlarged lymph nodes  Endocrine: No weight gain or loss      HEIGHT: 5' 9\"     WEIGHT: 230 lbs 0 oz   BP: Data Unavailable    PULSE: Data Unavailable    EXAM:   General: Alert and oriented to time, place, and self. In NAD   HEENT: Head AT/NC, EOMI, CN Grossly intact   Lungs: no respiratory distress, or pursed lip breathing   Heart: No obvious jugular venous distension present   Musculoskeltal: Normal movements. Normal appearing musculature  Skin: no suspicious lesions or rashes   Neuro: Alert, oriented, speech and mentation normal; moving all 4 extremities equally.   Psych: affect and mood normal      DIAGNOSIS: Request for sterilization    PLAN: The risks of the procedure as well as expectations for recovery and outcomes were splint in detail to him.  " He was counseled on the risks for bleeding infection and pain after the procedure.  He was instructed to continue to use contraception until he had proven azoospermia on a semen specimen.  This would normally be collected at least 3 months after the procedure.  He was instructed to hold all anticoagulants medications for one week prior to the procedure.  He was also instructed to shave the scrotum prior to procedure.  It was recommended that he have someone else drive him home after his vasectomy.  In light of these risks and expectations he would like to proceed.  We are scheduling a vasectomy in the office in the near future.  This visit today was performed via video.  He understands that because of this I was not able to examine the testicles in person today.  I will perform an examination at the beginning of the vasectomy and he understands that there is a small chance the procedure may need to be moved to the operating room.    Eliezer Aragon M.D.      Video-Visit Details    Video Start Time: 2:59 PM    Type of service:  Video Visit    Video End Time:3:04 PM    Originating Location (pt. Location): Home        Distant Location (provider location):  On-site    Platform used for Video Visit: Saturnino

## 2022-12-05 NOTE — PROGRESS NOTES
**PT WILL MEET FOR VISIT IN [place]**      Godwin is a 42 year old who is being evaluated via a billable video visit.      How would you like to obtain your AVS? Prism Skylab[employer]art     If the video visit is dropped, the invitation should be resent by: Text to cell phone: [phone]    Will anyone else be joining your video visit? No           VASECTOMY CONSULTATION NOTE  [facility]  [phone]  DATE OF VISIT: 12/5/2022    PATIENT NAME: Jose Luis Mazariegos    YOB: 1980      REASON FOR CONSULTATION: Mr. Jose Luis Mazariegos is a 42 year old year old gentleman who is being seen as a virtual visit in the urology clinic today requesting a vasectomy. He has 2 children and he wishes to have a vasectomy for birth control. He has no prior urologic history and has had no prior surgery on the testicles. He has no symptoms in the testicles.    PAST MEDICAL HISTORY:   Past Medical History:   Diagnosis Date     Perianal fistula        PAST SURGICAL HISTORY:   Past Surgical History:   Procedure Laterality Date     rectal fistula  01/01/2009       MEDICATIONS: No current outpatient medications on file.    ALLERGIES: No Known Allergies    FAMILY HISTORY:   Family History   Problem Relation Age of Onset     Skin Cancer Mother         Not sure what kind     No Known Problems Father      Depression Sister      Anxiety Disorder Sister      Dementia Maternal Grandmother      Cancer Maternal Grandfather      Skin Cancer Sister         Not sure what kind     Myocardial Infarction Paternal Grandfather      Myocardial Infarction Paternal Uncle      Cancer Paternal Uncle      Colon Cancer No family hx of      Prostate Cancer No family hx of      Diabetes No family hx of      Hypertension No family hx of        SOCIAL HISTORY:   Social History     Socioeconomic History     Marital status:      Spouse name: Not on file     Number of children: Not on file     Years of education: Not on file     Highest education level: Not on  file   Occupational History     Not on file   Tobacco Use     Smoking status: Former     Packs/day: 1.00     Years: 2.00     Pack years: 2.00     Types: Cigarettes     Start date: 1998     Quit date: 2000     Years since quittin.4     Smokeless tobacco: Never   Vaping Use     Vaping Use: Never used   Substance and Sexual Activity     Alcohol use: Yes     Comment: Not regularly, more socially.     Drug use: No     Sexual activity: Yes     Partners: Female     Birth control/protection: None   Other Topics Concern     Parent/sibling w/ CABG, MI or angioplasty before 65F 55M? 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  in 2021. Wife is now pregnant with baby girl, due 2022.        Has a 13 year old son.         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         Mela Valentino, DNP, APRN, CNP    08/10/22     Social Determinants of Health     Financial Resource Strain: Low Risk      Difficulty of Paying Living Expenses: Not hard at all   Food Insecurity: No Food Insecurity     Worried About Running Out of Food in the Last Year: Never true     Ran Out of Food in the Last Year: Never true   Transportation Needs: No Transportation Needs     Lack of Transportation (Medical): No     Lack of Transportation (Non-Medical): No   Physical Activity: Sufficiently Active     Days of Exercise per Week: 6 days     Minutes of Exercise per Session: 40 min   Stress: No Stress Concern Present     Feeling of Stress : Not at all   Social Connections: Moderately Integrated     Frequency of Communication with Friends and Family: More than three times a week     Frequency of Social Gatherings with Friends and Family: Three times a week     Attends [employer] Services: 1 to 4 times per year     Active Member of Clubs or Organizations: No     Attends Club or Organization  "Meetings: Not on file     Marital Status:    Intimate Partner Violence: Not on file   Housing Stability: Low Risk      Unable to Pay for Housing in the Last Year: No     Number of Places Lived in the Last Year: 2     Unstable Housing in the Last Year: No       REVIEW OF SYSTEMS:  Skin: No rash, pruritis, or skin pigmentation  Eyes: No changes in vision  Ears/Nose/Throat: No changes in hearing, no nosebleeds  Respiratory: No shortness of breath, dyspnea on exertion, cough, or hemoptysis  Cardiovascular: No chest pain or palpitations  Gastrointestinal: No diarrhea or constipation. No abdominal pain. No hematochezia  Genitourinary: see HPI  Musculoskeletal: No pain or swelling of joints, normal range of motion  Neurologic: No weakness or tremors  Psychiatric: No recent changes in memory or mood  Hematologic/Lymphatic/Immunologic: No easy bruising or enlarged lymph nodes  Endocrine: No weight gain or loss      HEIGHT: 5' 9\"     WEIGHT: 230 lbs 0 oz   BP: Data Unavailable    PULSE: Data Unavailable    EXAM:   General: Alert and oriented to time, place, and self. In NAD   HEENT: Head AT/NC, EOMI, CN Grossly intact   Lungs: no respiratory distress, or pursed lip breathing   Heart: No obvious jugular venous distension present   Musculoskeltal: Normal movements. Normal appearing musculature  Skin: no suspicious lesions or rashes   Neuro: Alert, oriented, speech and mentation normal; moving all 4 extremities equally.   Psych: affect and mood normal      DIAGNOSIS: Request for sterilization    PLAN: The risks of the procedure as well as expectations for recovery and outcomes were splint in detail to him.  He was counseled on the risks for bleeding infection and pain after the procedure.  He was instructed to continue to use contraception until he had proven azoospermia on a semen specimen.  This would normally be collected at least 3 months after the procedure.  He was instructed to hold all anticoagulants medications " for one week prior to the procedure.  He was also instructed to shave the scrotum prior to procedure.  It was recommended that he have someone else drive him home after his vasectomy.  In light of these risks and expectations he would like to proceed.  We are scheduling a vasectomy in the office in the near future.  This visit today was performed via video.  He understands that because of this I was not able to examine the testicles in person today.  I will perform an examination at the beginning of the vasectomy and he understands that there is a small chance the procedure may need to be moved to the operating room.    Eliezer Aragon M.D.      Video-Visit Details    Video Start Time: 2:59 PM    Type of service:  Video Visit    Video End Time:3:04 PM    Originating Location (pt. Location): Home        Distant Location (provider location):  On-site    Platform used for Video Visit: Saturnino

## 2022-12-05 NOTE — NURSING NOTE
Chief Complaint   Patient presents with     Consult For     Consultation for vasectomy     Farzana Geller, EMT

## 2023-02-03 ENCOUNTER — OFFICE VISIT (OUTPATIENT)
Dept: UROLOGY | Facility: CLINIC | Age: 43
End: 2023-02-03
Payer: COMMERCIAL

## 2023-02-03 VITALS
DIASTOLIC BLOOD PRESSURE: 78 MMHG | SYSTOLIC BLOOD PRESSURE: 126 MMHG | BODY MASS INDEX: 33.62 KG/M2 | HEIGHT: 69 IN | WEIGHT: 227 LBS

## 2023-02-03 DIAGNOSIS — Z30.2 ENCOUNTER FOR STERILIZATION: Primary | ICD-10-CM

## 2023-02-03 PROCEDURE — 88302 TISSUE EXAM BY PATHOLOGIST: CPT | Performed by: PATHOLOGY

## 2023-02-03 PROCEDURE — 55250 REMOVAL OF SPERM DUCT(S): CPT | Performed by: UROLOGY

## 2023-02-03 RX ORDER — LIDOCAINE HYDROCHLORIDE 20 MG/ML
20 INJECTION, SOLUTION INFILTRATION; PERINEURAL ONCE
Status: COMPLETED | OUTPATIENT
Start: 2023-02-03 | End: 2023-02-03

## 2023-02-03 RX ADMIN — LIDOCAINE HYDROCHLORIDE 20 ML: 20 INJECTION, SOLUTION INFILTRATION; PERINEURAL at 10:00

## 2023-02-03 ASSESSMENT — PAIN SCALES - GENERAL: PAINLEVEL: NO PAIN (0)

## 2023-02-03 NOTE — LETTER
2/3/2023       RE: Jose Luis Mazariegos  [address] [facility] [id number]     Dear Colleague,    Thank you for referring your patient, Jose Luis Mazariegos, to the [facility] [place] [facility]. Please see a copy of my visit note below.    OFFICE VASECTOMY OPERATIVE NOTE  [facility]  [phone]    DATE: 02/03/23  PATIENT: Jose Luis Mazariegos    YOB: 1980    Jose Luis Mazariegos is a 42 year old male.  He has 2 children and he wishes a vasectomy for birth control.  He has read the brochure and he has shaved himself.  I reviewed the vasectomy procedure with him explaining that it would be done with a local anesthetic given just in the location where the vasectomy would be done.  It would be done through scalpel-less incisions with the removal of segments of the vasa, cauterization of the ends, and burying the ends separate with sutures.      Pt. Understands:  1/1000-1/3000 risk of future pregnancy even with perfectly done vasectomy  -vasectomy is a permanent procedure    -he may cryopreserve sperm if he wishes   -1-5% risk of post-vasectomy pain syndrome   -1-5% risk of complication, primarily infection or bleeding  - he needs to have a semen sample that shows no sperm before getting approval for unprotected intercourse.      Complications such as bleeding, infection, and damage to other tissues in the area were discussed.  I recommended that an ice bag be placed on the scrotum off and on tonight to help reduce pain and swelling.      He was reminded that he was not sterile immediately after the vasectomy that it would take at least 20 ejaculations to empty the vas of any remaining sperm.  He was not to provide a semen sample until after the 20th ejaculation and not before 12 weeks after the vas. He was  to fulfill both of those requirements.   He understands it is his responsibility to find out the results of the vas  before proceeding with intercourse without birth control protection.  Other items discussed were activity afterwards, returning to work, voluntary physical activity,  resuming sexual activity, clothing to wear, bathing, and care of the vas site and expected changes in the site as healing progresses.  After signing the permit, bilateral vasectomy was done as described through scalpel-less incisions.       ANESTHESIA: Local    DETAILS OF PROCEDURE: The risks of the procedure were explained in detail to the patient and informed consent was obtained. The patient was placed supine on the procedure table and the penis and scrotum were prepped and draped in the standard sterile fashion. The right vas deferens was isolated and brought up to the median raphe of the scrotum. 1% lidocaine local anesthesia was used to infiltrate the skin and the spermatic cord. A sharp hemostat was used to make a skin puncture. Adventitial tissues were swept away from the vas. A 1 cm segment of the vas was excised and sent for pathology. The proximal and distal lumina of the vas were cauterized and then each segment was tied off in a knuckling-fashion with a 3-0 chromic suture. Hemostasis was ensured and the segments were released back into the scrotum. Next the left vas was brought up to the same incision and a vasectomy was performed in the similar fashion. At the end of the procedure a single 3-0 chromic suture was placed in the skin.     COMPLICATIONS: None    DISMISSAL INSTRUCTIONS:  - Ice pack to scrotum 15 to 20 minutes each hour awake for 36 to 40 hours.  - No strenuous activity or ejaculation for 14 days.  - No unprotected sexual activity until proven azoospermia on semen samples at 3 months.  - Referred to patient handout for normal postop expectations and indications to contact nurse or physician.    M.D.: Eliezer Aragon M.D.

## 2023-02-03 NOTE — PROGRESS NOTES
OFFICE VASECTOMY OPERATIVE NOTE  [facility]  ([phone]    DATE: 02/03/23  PATIENT: Jose Luis Mazariegos    YOB: 1980    Jose Luis Mazariegos is a 42 year old male.  He has 2 children and he wishes a vasectomy for birth control.  He has read the brochure and he has shaved himself.  I reviewed the vasectomy procedure with him explaining that it would be done with a local anesthetic given just in the location where the vasectomy would be done.  It would be done through scalpel-less incisions with the removal of segments of the vasa, cauterization of the ends, and burying the ends separate with sutures.      Pt. Understands:  1/1000-1/3000 risk of future pregnancy even with perfectly done vasectomy  -vasectomy is a permanent procedure    -he may cryopreserve sperm if he wishes   -1-5% risk of post-vasectomy pain syndrome   -1-5% risk of complication, primarily infection or bleeding  - he needs to have a semen sample that shows no sperm before getting approval for unprotected intercourse.      Complications such as bleeding, infection, and damage to other tissues in the area were discussed.  I recommended that an ice bag be placed on the scrotum off and on tonight to help reduce pain and swelling.      He was reminded that he was not sterile immediately after the vasectomy that it would take at least 20 ejaculations to empty the vas of any remaining sperm.  He was not to provide a semen sample until after the 20th ejaculation and not before 12 weeks after the vas. He was  to fulfill both of those requirements.   He understands it is his responsibility to find out the results of the vas before proceeding with intercourse without birth control protection.  Other items discussed were activity afterwards, returning to work, voluntary physical activity,  resuming sexual activity, clothing to wear, bathing, and care of the vas site and expected changes in the site as healing progresses.  After signing  the permit, bilateral vasectomy was done as described through scalpel-less incisions.       ANESTHESIA: Local    DETAILS OF PROCEDURE: The risks of the procedure were explained in detail to the patient and informed consent was obtained. The patient was placed supine on the procedure table and the penis and scrotum were prepped and draped in the standard sterile fashion. The right vas deferens was isolated and brought up to the median raphe of the scrotum. 1% lidocaine local anesthesia was used to infiltrate the skin and the spermatic cord. A sharp hemostat was used to make a skin puncture. Adventitial tissues were swept away from the vas. A 1 cm segment of the vas was excised and sent for pathology. The proximal and distal lumina of the vas were cauterized and then each segment was tied off in a knuckling-fashion with a 3-0 chromic suture. Hemostasis was ensured and the segments were released back into the scrotum. Next the left vas was brought up to the same incision and a vasectomy was performed in the similar fashion. At the end of the procedure a single 3-0 chromic suture was placed in the skin.     COMPLICATIONS: None    DISMISSAL INSTRUCTIONS:  - Ice pack to scrotum 15 to 20 minutes each hour awake for 36 to 40 hours.  - No strenuous activity or ejaculation for 14 days.  - No unprotected sexual activity until proven azoospermia on semen samples at 3 months.  - Referred to patient handout for normal postop expectations and indications to contact nurse or physician.    M.D.: Eliezer Aragon M.D.

## 2023-02-03 NOTE — NURSING NOTE
Chief Complaint   Patient presents with     Sterilization     Vasectomy     Patient has signed the consent form stating that we will be doing a bilateral vasectomy today and that this is the correct procedure.  I verbally confirmed the patient's identity using two indicators, relevant allergies, and that the correct equipment was available. Patient was cleaned and prepped according to the appropriate policy.  Equipment was prepped in a sterile fashion and MD was informed that patient was ready.    Consent read and signed: Yes  Aspirin/blood thinning products stopped 7 days prior to procedure: Pt. Took 2 ibuprofen tablets yesterday.  No other times this week  No Known Allergies    Physician performed procedure.  After the procedure the patient was instructed to wait approximately three months and at least 30 ejaculations prior to returning a sample to confirm sterility.  The patient was instructed to bring in sample within 3-6 hours post sample ejaculation.  Any additional medications after the procedure were sent to the patient's pharmacy and instructions were given according to company protocol and the performing physician.  The physician performing the procedure prescribed as they felt appropriate.  Patient was also instructed to avoid heavy lifting or strenuous activity for 10-14 days and to ice the scrotum.  Samples from the R and L vas deferens were sent to the lab and an order was placed for future semen analysis.        The following medication was given:     MEDICATION:  Lidocaine 2% Soln - 50 mL MDV  ROUTE: Infiltration  SITE: Scrotum  DOSE: 12 mL  LOT #: [id number]  : [employer]ensions  EXPIRATION DATE: 11/2023  NDC#: 6935-0720-07  Was there drug waste? Yes  Amount of drug waste (mL): 38.  Reason for waste:  As per MD  Multi-dose vial: Yes      Farzana Geller, EMT

## 2023-02-03 NOTE — PATIENT INSTRUCTIONS
POST VASECTOMY INSTRUCTIONS    1.) If you have any concerns or questions, please contact our office at [phone] and choose option 2.      2.) It is okay to take a shower, however, do not soak in water (bath,swimming, hot tub,etc....) until your incision is healed.    3.) You might notice some swelling, mild bruising, and discomfort for several days after your vasectomy. This is to be expected. For at least the next 24 hours, an ice pack should be applied for 20 minutes every hour that you are awake. Ice will help with discomfort and swelling. Do not place directly on the skin.    4.) No intercourse, strenuous activity or exercise for at least 7-10 days, even if you feel fine.    5.) You need to wear good scrotal support while you are healing. We strongly recommend an athletic supporter or a pair of regular briefs that are one size too small. Boxer briefs do not offer enough support.    6.) Tylenol as directed on the bottle is preferred for discomfort. Please avoid any blood thinning products such as ibuprofen and aspirin (Motrin, Advil, Excedrin, Aleve, ect..) for at least the next week.    7.) It is normal to have mild drainage from the incision area for several days. However, please contact our office if you notice: bright red blood that does not stop after three days, increased pain, heat at the incision, red streaks, foul smelling discharge, or if you start to run a fever.     8.) YOU MUST CONTINUE BIRTH CONTROL UNTIL WE CONFIRM YOUR STERILITY.  This process can take up to a year to complete (rare occurrence).     9.) You have been given a form with specimen cup and instructions for your follow up specimen. You will be cleared once we receive ONE negative specimen. If your specimen comes back positive (sperm seen) you will be asked to repeat the test. This does not mean that your vasectomy has failed.

## 2023-02-09 LAB
PATH REPORT.COMMENTS IMP SPEC: NORMAL
PATH REPORT.COMMENTS IMP SPEC: NORMAL
PATH REPORT.FINAL DX SPEC: NORMAL
PATH REPORT.GROSS SPEC: NORMAL
PATH REPORT.MICROSCOPIC SPEC OTHER STN: NORMAL
PATH REPORT.RELEVANT HX SPEC: NORMAL
PHOTO IMAGE: NORMAL

## 2023-05-12 ENCOUNTER — LAB (OUTPATIENT)
Dept: LAB | Facility: CLINIC | Age: 43
End: 2023-05-12
Payer: COMMERCIAL

## 2023-05-12 DIAGNOSIS — Z30.2 ENCOUNTER FOR STERILIZATION: ICD-10-CM

## 2023-05-12 LAB — SEMEN ANALYSIS P VAS PNL: NORMAL

## 2023-05-12 PROCEDURE — 89321 SEMEN ANAL SPERM DETECTION: CPT

## 2023-09-30 ENCOUNTER — HEALTH MAINTENANCE LETTER (OUTPATIENT)
Age: 43
End: 2023-09-30

## 2024-01-24 ENCOUNTER — NURSE TRIAGE (OUTPATIENT)
Dept: NURSING | Facility: CLINIC | Age: 44
End: 2024-01-24
Payer: COMMERCIAL

## 2024-01-24 ENCOUNTER — OFFICE VISIT (OUTPATIENT)
Dept: PEDIATRICS | Facility: CLINIC | Age: 44
End: 2024-01-24
Payer: COMMERCIAL

## 2024-01-24 VITALS
TEMPERATURE: 98.5 F | DIASTOLIC BLOOD PRESSURE: 80 MMHG | OXYGEN SATURATION: 99 % | BODY MASS INDEX: 35.03 KG/M2 | SYSTOLIC BLOOD PRESSURE: 132 MMHG | WEIGHT: 237.2 LBS | HEART RATE: 93 BPM | RESPIRATION RATE: 14 BRPM

## 2024-01-24 DIAGNOSIS — Z20.828 EXPOSURE TO INFLUENZA: Primary | ICD-10-CM

## 2024-01-24 DIAGNOSIS — J34.89 SINUS PRESSURE: ICD-10-CM

## 2024-01-24 DIAGNOSIS — R05.8 PRODUCTIVE COUGH: Primary | ICD-10-CM

## 2024-01-24 DIAGNOSIS — R05.8 PRODUCTIVE COUGH: ICD-10-CM

## 2024-01-24 LAB
FLUAV AG SPEC QL IA: NEGATIVE
FLUBV AG SPEC QL IA: NEGATIVE

## 2024-01-24 PROCEDURE — 87804 INFLUENZA ASSAY W/OPTIC: CPT | Performed by: NURSE PRACTITIONER

## 2024-01-24 PROCEDURE — 99213 OFFICE O/P EST LOW 20 MIN: CPT | Performed by: NURSE PRACTITIONER

## 2024-01-24 PROCEDURE — 87635 SARS-COV-2 COVID-19 AMP PRB: CPT | Performed by: NURSE PRACTITIONER

## 2024-01-24 RX ORDER — AZITHROMYCIN 250 MG/1
TABLET, FILM COATED ORAL
Qty: 6 TABLET | Refills: 0 | Status: SHIPPED | OUTPATIENT
Start: 2024-01-24 | End: 2024-01-29

## 2024-01-24 ASSESSMENT — PAIN SCALES - GENERAL: PAINLEVEL: NO PAIN (0)

## 2024-01-24 NOTE — PROGRESS NOTES
Assessment & Plan     Exposure to influenza  Productive cough  Sinus pressure  Exposure to influenza.  Labs ordered today.  You likely have a viral infection.  Conservative measures discussed including increased fluids, nasal saline irrigation (neti pot), warm steamy shower, cough suppressants, expectorants (Mucinex), decongestants (Pseudofed), and analgesics (Tylenol and/or Ibuprofen). Follow-up if symptoms worsen or do not improve in 5 days. Seek emergency care if you develop fever over 104 or shortness of breath.    - Symptomatic COVID-19 Virus (Coronavirus) by PCR; Future  - Influenza A/B antigen  - Symptomatic COVID-19 Virus (Coronavirus) by PCR Nose    Graciela Connelly is a 43 year old, presenting for the following health issues:  Ear Problem        1/24/2024     9:36 AM   Additional Questions   Roomed by Salome Darling   Accompanied by CAROLANN     History of Present Illness       Reason for visit:  Flu like symptoms  Symptom onset:  3-7 days ago  Symptoms include:  Headache, congestion, cough, ear pain  Symptom intensity:  Moderate  Symptom progression:  Staying the same  Had these symptoms before:  No    He eats 0-1 servings of fruits and vegetables daily.He consumes 0 sweetened beverage(s) daily.He exercises with enough effort to increase his heart rate 10 to 19 minutes per day.  He exercises with enough effort to increase his heart rate 3 or less days per week.   He is taking medications regularly.     Exposed to influenza Monday 1/15/24.  Daughter symptoms started 1/15 and dx with influenza 1/17.  Patient started to feel ill last week.  Fever resolved.  Productive cough, left ear fullness, sinus pressure, sinus headaches.  No hx of asthma          Objective    /80 (BP Location: Right arm, Patient Position: Sitting, Cuff Size: Adult Large)   Pulse 93   Temp 98.5  F (36.9  C) (Tympanic)   Resp 14   Wt 107.6 kg (237 lb 3.2 oz)   SpO2 99%   BMI 35.03 kg/m    Body mass index is 35.03 kg/m .    Physical  Exam   GENERAL: alert and no distress  EYES: Eyes grossly normal to inspection, PERRL and conjunctivae and sclerae normal  HENT: ear canals and TM's normal, nose and mouth without ulcers or lesions  NECK: no adenopathy, no asymmetry, masses, or scars  RESP: lungs clear to auscultation - no rales, rhonchi or wheezes  CV: regular rate and rhythm, normal S1 S2, no S3 or S4, no murmur, click or rub, no peripheral edema  PSYCH: mentation appears normal, affect normal/bright        Signed Electronically by: Renee Birch NP

## 2024-01-24 NOTE — TELEPHONE ENCOUNTER
Wants an appointment. Not well for six days. 1 yr old diagnosed with flu one week ago. He came down with symptoms last Friday.  Had body aches, fever. Now it's sinus pressure, heavy cough, can't hear out of left ear with congestion. Head pain with coughing.  I connected with scheduling for an appointment and advised urgent care if they can't get him in.  Lor De Santiago RN  [facility]    Reason for Disposition   Earache    Additional Information   Negative: SEVERE difficulty breathing (e.g., struggling for each breath, speaks in single words)   Negative: Sounds like a life-threatening emergency to the triager   Negative: [1] Sinus infection AND [2] taking an antibiotic AND [3] symptoms continue   Negative: [1] Difficulty breathing AND [2] not from stuffy nose (e.g., not relieved by cleaning out the nose)   Negative: [1] SEVERE headache AND [2] fever     Headache when coughing   Negative: [1] Redness or swelling on the cheek, forehead or around the eye AND [2] fever   Negative: Fever > 104 F (40 C)   Negative: Patient sounds very sick or weak to the triager   Negative: [1] SEVERE pain AND [2] not improved 2 hours after pain medicine     Pain at sinuses 7 to 5.   Negative: [1] Redness or swelling on the cheek, forehead or around the eye AND [2] no fever   Negative: [1] Fever > 101 F (38.3 C) AND [2] age > 60 years   Negative: [1] Fever > 100.0 F (37.8 C) AND [2] bedridden (e.g., CVA, chronic illness, recovering from surgery)   Negative: [1] Fever > 100.0 F (37.8 C) AND [2] diabetes mellitus or weak immune system (e.g., HIV positive, cancer chemo, splenectomy, organ transplant, chronic steroids)   Negative: Fever present > 3 days (72 hours)   Negative: [1] Fever returns after gone for over 24 hours AND [2] symptoms worse or not improved   Negative: [1] Sinus pain (not just congestion) AND [2] fever    Protocols used: Sinus Pain or Congestion-A-

## 2024-01-25 LAB — SARS-COV-2 RNA RESP QL NAA+PROBE: NEGATIVE

## 2024-06-29 SDOH — HEALTH STABILITY: PHYSICAL HEALTH: ON AVERAGE, HOW MANY MINUTES DO YOU ENGAGE IN EXERCISE AT THIS LEVEL?: 30 MIN

## 2024-06-29 SDOH — HEALTH STABILITY: PHYSICAL HEALTH: ON AVERAGE, HOW MANY DAYS PER WEEK DO YOU ENGAGE IN MODERATE TO STRENUOUS EXERCISE (LIKE A BRISK WALK)?: 2 DAYS

## 2024-06-29 ASSESSMENT — SOCIAL DETERMINANTS OF HEALTH (SDOH): HOW OFTEN DO YOU GET TOGETHER WITH FRIENDS OR RELATIVES?: THREE TIMES A WEEK

## 2024-07-03 ENCOUNTER — TELEPHONE (OUTPATIENT)
Dept: DERMATOLOGY | Facility: CLINIC | Age: 44
End: 2024-07-03

## 2024-07-03 ENCOUNTER — OFFICE VISIT (OUTPATIENT)
Dept: PEDIATRICS | Facility: CLINIC | Age: 44
End: 2024-07-03
Payer: COMMERCIAL

## 2024-07-03 VITALS
DIASTOLIC BLOOD PRESSURE: 82 MMHG | OXYGEN SATURATION: 98 % | HEART RATE: 73 BPM | WEIGHT: 234.3 LBS | SYSTOLIC BLOOD PRESSURE: 128 MMHG | RESPIRATION RATE: 14 BRPM | TEMPERATURE: 97.8 F | HEIGHT: 70 IN | BODY MASS INDEX: 33.54 KG/M2

## 2024-07-03 DIAGNOSIS — E66.811 CLASS 1 OBESITY DUE TO EXCESS CALORIES WITH SERIOUS COMORBIDITY AND BODY MASS INDEX (BMI) OF 34.0 TO 34.9 IN ADULT: ICD-10-CM

## 2024-07-03 DIAGNOSIS — E78.5 HYPERLIPIDEMIA LDL GOAL <100: ICD-10-CM

## 2024-07-03 DIAGNOSIS — Z00.00 ENCOUNTER FOR PREVENTATIVE ADULT HEALTH CARE EXAMINATION: Primary | ICD-10-CM

## 2024-07-03 DIAGNOSIS — Z80.8 FAMILY HISTORY OF SKIN CANCER: ICD-10-CM

## 2024-07-03 DIAGNOSIS — Z12.83 SKIN CANCER SCREENING: ICD-10-CM

## 2024-07-03 DIAGNOSIS — E66.09 CLASS 1 OBESITY DUE TO EXCESS CALORIES WITH SERIOUS COMORBIDITY AND BODY MASS INDEX (BMI) OF 34.0 TO 34.9 IN ADULT: ICD-10-CM

## 2024-07-03 LAB
ALBUMIN SERPL BCG-MCNC: 4.1 G/DL (ref 3.5–5.2)
ALP SERPL-CCNC: 72 U/L (ref 40–150)
ALT SERPL W P-5'-P-CCNC: 19 U/L (ref 0–70)
ANION GAP SERPL CALCULATED.3IONS-SCNC: 10 MMOL/L (ref 7–15)
AST SERPL W P-5'-P-CCNC: 19 U/L (ref 0–45)
BILIRUB SERPL-MCNC: 0.3 MG/DL
BUN SERPL-MCNC: 12.4 MG/DL (ref 6–20)
CALCIUM SERPL-MCNC: 9.2 MG/DL (ref 8.6–10)
CHLORIDE SERPL-SCNC: 105 MMOL/L (ref 98–107)
CHOLEST SERPL-MCNC: 199 MG/DL
CREAT SERPL-MCNC: 0.84 MG/DL (ref 0.67–1.17)
DEPRECATED HCO3 PLAS-SCNC: 24 MMOL/L (ref 22–29)
EGFRCR SERPLBLD CKD-EPI 2021: >90 ML/MIN/1.73M2
FASTING STATUS PATIENT QL REPORTED: ABNORMAL
FASTING STATUS PATIENT QL REPORTED: NORMAL
GLUCOSE SERPL-MCNC: 91 MG/DL (ref 70–99)
HDLC SERPL-MCNC: 53 MG/DL
LDLC SERPL CALC-MCNC: 129 MG/DL
NONHDLC SERPL-MCNC: 146 MG/DL
POTASSIUM SERPL-SCNC: 4 MMOL/L (ref 3.4–5.3)
PROT SERPL-MCNC: 7.3 G/DL (ref 6.4–8.3)
SODIUM SERPL-SCNC: 139 MMOL/L (ref 135–145)
TRIGL SERPL-MCNC: 86 MG/DL

## 2024-07-03 PROCEDURE — 80061 LIPID PANEL: CPT | Performed by: NURSE PRACTITIONER

## 2024-07-03 PROCEDURE — 80053 COMPREHEN METABOLIC PANEL: CPT | Performed by: NURSE PRACTITIONER

## 2024-07-03 PROCEDURE — 36415 COLL VENOUS BLD VENIPUNCTURE: CPT | Performed by: NURSE PRACTITIONER

## 2024-07-03 PROCEDURE — 99396 PREV VISIT EST AGE 40-64: CPT | Performed by: NURSE PRACTITIONER

## 2024-07-03 ASSESSMENT — PAIN SCALES - GENERAL: PAINLEVEL: NO PAIN (0)

## 2024-07-03 NOTE — TELEPHONE ENCOUNTER
This encounter is being sent to inform the clinic that this patient has a referral from Mela Valentino for the diagnoses of Skin Check and has requested that this patient be seen within 1-2 Weeks and/or with [facility].  Based on the availability of our provider(s), we are unable to accommodate this request.    Were all sites offered this patient?  Yes    Does scheduling algorithm request to schedule next available?  Patient has been scheduled for the first available opening with Dr. Mercado  on 3/13/25.  We have informed the patient that the clinic will review their referral and reach out if a sooner appointment is medically necessary.

## 2024-07-03 NOTE — TELEPHONE ENCOUNTER
Patient Contact    Attempt # 1    Was call answered?  No    Left message on answering machine for patient to call back.    Sofia TORRES RN  Dermatology   [phone]

## 2024-07-03 NOTE — PROGRESS NOTES
"Preventive Care Visit  [facility] TIA Vera CNP, Family Medicine  Jul 3, 2024      Assessment & Plan     Encounter for preventative adult health care examination  Age appropriate screening and preventative care have been addressed today. Vaccinations have been reviewed and discussed, declines covid and hep B vaccine today. Patient has been advised to follow a balanced diet. They have been advised to undertake routine aerobic activity. Recommend annual vision exams as well as biannual dental exams. They will follow up for annual physical again in one year.     Class 1 obesity due to excess calories with serious comorbidity and body mass index (BMI) of 34.0 to 34.9 in adult  Body mass index is 34.08 kg/m . Reviewed health lifestyle - diet and exercise. Recommend minimum 150 min of moderate intensity exercise/week.   - Lipid panel reflex to direct LDL Fasting  - Comprehensive metabolic panel (BMP + Alb, Alk Phos, ALT, AST, Total. Bili, TP)    Hyperlipidemia LDL goal <100  Recheck FLP today.     Family history of skin cancer  Skin cancer screening  He does have three moles on his back that I would like dermatology to take a look at. Recommend derm consult within the next 3 months. He will let me know if he is unable to be seen in this timeframe.    - Adult Dermatology  Referral; Future        BMI  Estimated body mass index is 34.08 kg/m  as calculated from the following:    Height as of this encounter: 1.766 m (5' 9.53\").    Weight as of this encounter: 106.3 kg (234 lb 4.8 oz).   Weight management plan: Discussed healthy diet and exercise guidelines    Counseling  Appropriate preventive services were discussed with this patient, including applicable screening as appropriate for fall prevention, nutrition, physical activity, Tobacco-use cessation, weight loss and cognition.  Checklist reviewing preventive services available has been given to the patient.  Reviewed patient's diet, " addressing concerns and/or questions.   He is at risk for lack of exercise and has been provided with information to increase physical activity for the benefit of his well-being.   He is at risk for psychosocial distress and has been provided with information to reduce risk.       Graciela Connelly is a 43 year old, presenting for the following:  Physical        7/3/2024     7:43 AM   Additional Questions   Roomed by Alejandro BRYAN   Accompanied by Self         7/3/2024     7:43 AM   Patient Reported Additional Medications   Patient reports taking the following new medications No        Health Care Directive  Patient does not have a Health Care Directive or Living Will: Discussed advance care planning with patient; information given to patient to review.    HPI          6/29/2024   General Health   How would you rate your overall physical health? Good   Feel stress (tense, anxious, or unable to sleep) Only a little      (!) STRESS CONCERN      6/29/2024   Nutrition   Three or more servings of calcium each day? Yes   Diet: Regular (no restrictions)   How many servings of fruit and vegetables per day? (!) 2-3   How many sweetened beverages each day? 0-1            6/29/2024   Exercise   Days per week of moderate/strenous exercise 2 days   Average minutes spent exercising at this level 30 min      (!) EXERCISE CONCERN      6/29/2024   Social Factors   Frequency of gathering with friends or relatives Three times a week   Worry food won't last until get money to buy more No   Food not last or not have enough money for food? No   Do you have housing? (Housing is defined as stable permanent housing and does not include staying ouside in a car, in a tent, in an abandoned building, in an overnight shelter, or couch-surfing.) Yes   Are you worried about losing your housing? No   Lack of transportation? No   Unable to get utilities (heat,electricity)? No            6/29/2024   Dental   Dentist two times every year? Yes             2024   TB Screening   Were you born outside of the [place]? No              Today's PHQ-2 Score:       2024     6:38 AM   PHQ-2 (  [employer])   Q1: Little interest or pleasure in doing things 0   Q2: Feeling down, depressed or hopeless 0   PHQ-2 Score 0   Q1: Little interest or pleasure in doing things Not at all   Q2: Feeling down, depressed or hopeless Not at all   PHQ-2 Score 0         2024   Substance Use   Alcohol more than 3/day or more than 7/wk No   Do you use any other substances recreationally? (!) ALCOHOL        Social History     Tobacco Use    Smoking status: Former     Current packs/day: 0.00     Average packs/day: 1 pack/day for 2.0 years (2.0 ttl pk-yrs)     Types: Cigarettes     Start date: 1998     Quit date: 2000     Years since quittin.9    Smokeless tobacco: Never   Vaping Use    Vaping status: Never Used   Substance Use Topics    Alcohol use: Yes     Comment: Not regularly, more socially.    Drug use: No           2024   STI Screening   New sexual partner(s) since last STI/HIV test? No      ASCVD Risk   The 10-year ASCVD risk score (Rosa NOBLE, et al., 2019) is: 1.3%    Values used to calculate the score:      Age: 43 years      Sex: Male      Is Non- : No      Diabetic: No      Tobacco smoker: No      Systolic Blood Pressure: 128 mmHg      Is BP treated: No      HDL Cholesterol: 60 mg/dL      Total Cholesterol: 199 mg/dL       Reviewed and updated as needed this visit by Provider                    Patient Active Problem List   Diagnosis    CARDIOVASCULAR SCREENING; LDL GOAL LESS THAN 160    Obesity, unspecified obesity severity, unspecified obesity type     Past Surgical History:   Procedure Laterality Date    rectal fistula  2009    VASECTOMY         Social History     Tobacco Use    Smoking status: Former     Current packs/day: 0.00     Average packs/day: 1 pack/day for 2.0 years (2.0 ttl pk-yrs)     Types: Cigarettes      "Start date: 1998     Quit date: 2000     Years since quittin.9    Smokeless tobacco: Never   Substance Use Topics    Alcohol use: Yes     Comment: Not regularly, more socially.     Family History   Problem Relation Age of Onset    Skin Cancer Mother         Not sure what kind    Other Cancer Mother         Skin cancer    No Known Problems Father     Depression Sister     Anxiety Disorder Sister     Dementia Maternal Grandmother     Cancer Maternal Grandfather     Skin Cancer Sister         Not sure what kind    Myocardial Infarction Paternal Grandfather     Coronary Artery Disease Paternal Grandfather     Myocardial Infarction Paternal Uncle     Cancer Paternal Uncle     Other Cancer Sister         Skin cancer    Colon Cancer No family hx of     Prostate Cancer No family hx of     Diabetes No family hx of     Hypertension No family hx of               Objective    Exam  /82 (BP Location: Right arm, Patient Position: Sitting, Cuff Size: Adult Large)   Pulse 73   Temp 97.8  F (36.6  C) (Tympanic)   Resp 14   Ht 1.766 m (5' 9.53\")   Wt 106.3 kg (234 lb 4.8 oz)   SpO2 98%   BMI 34.08 kg/m     Estimated body mass index is 34.08 kg/m  as calculated from the following:    Height as of this encounter: 1.766 m (5' 9.53\").    Weight as of this encounter: 106.3 kg (234 lb 4.8 oz).    Physical Exam  Constitutional: appears to be in no acute distress, comfortable, pleasant.   Eyes: anicteric, conjunctiva clear without drainage or erythema. RAQUEL.   Ears, Nose and Throat: tympanic membranes gray with LR,  nose without nasal discharge. OP: no erythema to posterior pharynx, negative post nasal drainage, tonsils +1 no erythema or exudate.  Neck: supple, thyroid palpable,not enlarged, no nodules   Cardiovascular: regular rate and rhythm, normal S1 and S2, no murmurs, rubs or gallops, peripheral pulses full and symmetric; negative peripheral edema   Respiratory: Air entry throughout. Breathing pattern " unlabored without the use of accessory muscles. Clear to auscultation A and P, no wheezes or crackles, normal breath sounds.    Gastrointestinal: rounded, soft. Positive bowel sounds x4, nontender, no masses.   Musculoskeletal: full range of motion, no edema.   Skin: pink, turgor smooth and elastic. Negative for lesions or dryness. He does have three moles on his back that stick out amongst the rest. One of these moles meets a few of the ABCD criteria for skin cancer.   Neurological: normal gait, no tremor.   Psychological: appropriate mood and affect.   Lymphatic: no cervical, axillary, supraclavicular, or infraclavicular lymphadenopathy.        Signed Electronically by: TIA Tate CNP     pain, lower leg

## 2024-11-04 ENCOUNTER — OFFICE VISIT (OUTPATIENT)
Dept: DERMATOLOGY | Facility: CLINIC | Age: 44
End: 2024-11-04
Attending: NURSE PRACTITIONER
Payer: COMMERCIAL

## 2024-11-04 DIAGNOSIS — Z12.83 ENCOUNTER FOR SCREENING FOR MALIGNANT NEOPLASM OF SKIN: ICD-10-CM

## 2024-11-04 DIAGNOSIS — Z80.8 FAMILY HISTORY OF SKIN CANCER: ICD-10-CM

## 2024-11-04 DIAGNOSIS — D48.9 NEOPLASM OF UNCERTAIN BEHAVIOR: ICD-10-CM

## 2024-11-04 DIAGNOSIS — L81.4 LENTIGINES: ICD-10-CM

## 2024-11-04 DIAGNOSIS — D22.9 MULTIPLE BENIGN NEVI: ICD-10-CM

## 2024-11-04 DIAGNOSIS — L82.1 SEBORRHEIC KERATOSES: Primary | ICD-10-CM

## 2024-11-04 DIAGNOSIS — D18.01 CHERRY ANGIOMA: ICD-10-CM

## 2024-11-04 DIAGNOSIS — A63.0 GENITAL WARTS: ICD-10-CM

## 2024-11-04 PROCEDURE — 11102 TANGNTL BX SKIN SINGLE LES: CPT | Mod: XS | Performed by: STUDENT IN AN ORGANIZED HEALTH CARE EDUCATION/TRAINING PROGRAM

## 2024-11-04 PROCEDURE — 99203 OFFICE O/P NEW LOW 30 MIN: CPT | Mod: 25 | Performed by: STUDENT IN AN ORGANIZED HEALTH CARE EDUCATION/TRAINING PROGRAM

## 2024-11-04 PROCEDURE — 88305 TISSUE EXAM BY PATHOLOGIST: CPT | Performed by: DERMATOLOGY

## 2024-11-04 PROCEDURE — 17110 DESTRUCTION B9 LES UP TO 14: CPT | Performed by: STUDENT IN AN ORGANIZED HEALTH CARE EDUCATION/TRAINING PROGRAM

## 2024-11-04 NOTE — PROGRESS NOTES
[facility] Dermatology Note  Encounter Date: Nov 4, 2024  Office Visit     Reviewed patients past medical history and pertinent chart review prior to patients visit today.     Dermatology Problem List:  # NUB, R superior back, shave biopsy 11/4/2024     Last FBSE - 11/4/2024 yearly  # Personal history of skin cancer (none)  # Family history of unknown skin cancer (parents, siblings)    Pertinent Medical History  None    Social History  Grew up on [place], likes to Personal and fish  Works in [employer]  ____________________________________________    Assessment & Plan:     # Neoplasm of uncertain behavior (shave biopsy):  R superior back, ddx melanoma vs pigmented BCC     Procedure: biopsy by shave   Risks/benefits discussed, including but not limited to bleeding, infection, scar, incomplete removal, and non-diagnostic biopsy. Above site(s) cleansed with alcohol pad and injected with 1% lidocaine with epinephrine. Once anesthesia was obtained, biopsy performed with Gilette blade. Tissue placed in labeled formalin container and sent to pathology. Hemostasis achieved with aluminum chloride. Vaseline and bandage applied to wound. Pt tolerated procedure well and given post biopsy care instructions.    # Genital wart x 1  - Reviewed viral etiology. Encouraged female partner to have regular screenings (ie pap smears).   - Treatments reviewed: do nothing vs cryotherapy. Pt elects cryotherapy.     Procedure: cryotherapy (inferior mons pubis)  Verbal consent obtained after discussion of risks/benefits, including but not limited to dyspigmentation/scar, blister, and pain. Lesion(s) treated with 1-2 mm freeze border for 1-2 cycles with liquid nitrogen. Care instructions provided.     # Family history of skin cancer  # Multiple nevi, trunk and extremities  # Solar lentigines  Discussed importance of self exams, ABCDE criteria and importance of photoprotection reviewed.   - Reviewed the compounding benefits  of incremental changes to sun protective clothing behaviors including increased frequency of sunscreen and sun protective clothing like broad brimmed hats and longsleeved UPF containing clothing    # Cherry angiomas  # Seborrheic keratoses  Reassured benign, no treatment required.     Follow-up: 1 year for full body skin exam, as needed for new or changing lesions or new concerns    All risks, benefits and alternatives were discussed with patient.  Patient is in agreement and understands the assessment and plan.  All questions were answered.    STAFF:  Vineet Zavala PA-C  [facility] Dermatology    ____________________________________________    CC: Derm Problem (Skin check )    HPI:  Mr. Jose Luis Mazariegos is a(n) 43 year old male who presents today as a new patient for a full body skin cancer screening.     Spots of concern today:  - Back x 1, pcp recommended he come in, has bleed and enlarging  - Base of penis x 1, present for 1 year, does not itch/bleed/enlarge    Reports utilizing with photoprotection       Physical Exam:  Vitals: There were no vitals taken for this visit.  SKIN:  Total skin excluding the undergarment areas was performed. The exam included the head/face, neck, both arms, chest, back, abdomen, both legs, digits and/or nails.   Garcia type 2, less than 100 nevi  - R superior back: 5x5 mm asymmetric brown/pink papule for biopsy  - Inferior mons pubis: brown, verrucal papule, consistent with genital wart  - Sun exposed skin: tan, homogenous macules, consistent with solar lentigines.   - Trunk/extremities: 1-2 mm red dome shaped symmetric papules, consistent with cherry angiomas.   - Trunk/extremities: tan/brown macules and papules, consistent with benign nevi. No concerning features on dermoscopy.   - Trunk/extremities: Stuck on papules , consistent with seborrheic keratoses.      No other lesions of concern on areas examined.     Medications:  No current outpatient medications on file.      No current facility-administered medications for this visit.      Past Medical History:   Patient Active Problem List   Diagnosis    CARDIOVASCULAR SCREENING; LDL GOAL LESS THAN 160    Class 1 obesity due to excess calories with serious comorbidity and body mass index (BMI) of 34.0 to 34.9 in adult    Family history of skin cancer    Hyperlipidemia LDL goal <100     Past Medical History:   Diagnosis Date    Depressive disorder 2001    I have not had problems with depression for about 10 years    Perianal fistula        CC Mela Valentino, TIA CNP  [address] [place] [address] on close of this encounter.

## 2024-11-04 NOTE — LETTER
11/4/2024      Jose Luis Mazariegos  [address] [facility] [address]      Dear Colleague,    Thank you for referring your patient, Jose Luis Mazariegos, to the [facility]. Please see a copy of my visit note below.    [facility] Dermatology Note  Encounter Date: Nov 4, 2024  Office Visit     Reviewed patients past medical history and pertinent chart review prior to patients visit today.     Dermatology Problem List:  # NUB, R superior back, shave biopsy 11/4/2024     Last FBSE - 11/4/2024 yearly  # Personal history of skin cancer (none)  # Family history of unknown skin cancer (parents, siblings)    Pertinent Medical History  None    Social History  [place] on [place], likes to [employer] and fish  Works in finance  ____________________________________________    Assessment & Plan:     # Neoplasm of uncertain behavior (shave biopsy):  R superior back, ddx melanoma vs pigmented BCC     Procedure: biopsy by shave   Risks/benefits discussed, including but not limited to bleeding, infection, scar, incomplete removal, and non-diagnostic biopsy. Above site(s) cleansed with alcohol pad and injected with 1% lidocaine with epinephrine. Once anesthesia was obtained, biopsy performed with Gilette blade. Tissue placed in labeled formalin container and sent to pathology. Hemostasis achieved with aluminum chloride. Vaseline and bandage applied to wound. Pt tolerated procedure well and given post biopsy care instructions.    # Genital wart x 1  - Reviewed viral etiology. Encouraged female partner to have regular screenings (ie pap smears).   - Treatments reviewed: do nothing vs cryotherapy. Pt elects cryotherapy.     Procedure: cryotherapy (inferior mons pubis)  Verbal consent obtained after discussion of risks/benefits, including but not limited to dyspigmentation/scar, blister, and pain. Lesion(s) treated with 1-2 mm freeze border for 1-2 cycles with liquid nitrogen. Care  instructions provided.     # Family history of skin cancer  # Multiple nevi, trunk and extremities  # Solar lentigines  Discussed importance of self exams, ABCDE criteria and importance of photoprotection reviewed.   - Reviewed the compounding benefits of incremental changes to sun protective clothing behaviors including increased frequency of sunscreen and sun protective clothing like broad brimmed hats and longsleeved UPF containing clothing    # Cherry angiomas  # Seborrheic keratoses  Reassured benign, no treatment required.     Follow-up: 1 year for full body skin exam, as needed for new or changing lesions or new concerns    All risks, benefits and alternatives were discussed with patient.  Patient is in agreement and understands the assessment and plan.  All questions were answered.    STAFF:  Vineet Zavala PA-C  [facility] Dermatology    ____________________________________________    CC: Derm Problem (Skin check )    HPI:  Mr. Jose Luis Mazariegos is a(n) 43 year old male who presents today as a new patient for a full body skin cancer screening.     Spots of concern today:  - Back x 1, pcp recommended he come in, has bleed and enlarging  - Base of penis x 1, present for 1 year, does not itch/bleed/enlarge    Reports utilizing with photoprotection       Physical Exam:  Vitals: There were no vitals taken for this visit.  SKIN:  Total skin excluding the undergarment areas was performed. The exam included the head/face, neck, both arms, chest, back, abdomen, both legs, digits and/or nails.   Garcia type 2, less than 100 nevi  - R superior back: 5x5 mm asymmetric brown/pink papule for biopsy  - Inferior mons pubis: brown, verrucal papule, consistent with genital wart  - Sun exposed skin: tan, homogenous macules, consistent with solar lentigines.   - Trunk/extremities: 1-2 mm red dome shaped symmetric papules, consistent with cherry angiomas.   - Trunk/extremities: tan/brown macules and papules,  consistent with benign nevi. No concerning features on dermoscopy.   - Trunk/extremities: Stuck on papules , consistent with seborrheic keratoses.      No other lesions of concern on areas examined.     Medications:  No current outpatient medications on file.     No current facility-administered medications for this visit.      Past Medical History:   Patient Active Problem List   Diagnosis     CARDIOVASCULAR SCREENING; LDL GOAL LESS THAN 160     Class 1 obesity due to excess calories with serious comorbidity and body mass index (BMI) of 34.0 to 34.9 in adult     Family history of skin cancer     Hyperlipidemia LDL goal <100     Past Medical History:   Diagnosis Date     Depressive disorder 2001    I have not had problems with depression for about 10 years     Perianal fistula        CC Mela Valentino, APRN CNP  [address] [place] [address] on close of this encounter.    Attestation with edits by Leo Oreilly MD at 11/4/2024  3:41 PM:  I have personally examined this patient and agree with thePA's documentation and plan of care. I have reviewed and amended the PA's note. The documentation accurately reflects my clinical observations, diagnoses, treatment and follow-up plans. I was present for key portions of the procedure(s).       Leo Oreilly MD  Dermatology Staff      Again, thank you for allowing me to participate in the care of your patient.        Sincerely,        Leo Oreilly MD

## 2024-11-04 NOTE — PATIENT INSTRUCTIONS
Wound Care Instructions   FOR SUPERFICIAL WOUNDS   [facility] [phone]  AFTER 24 HOURS YOU SHOULD REMOVE THE BANDAGE AND BEGIN DAILY DRESSING CHANGES AS FOLLOWS:   1) Remove Dressing.   2) Clean and dry the area with tap water using a Q-tip or sterile gauze pad.   3) Apply Vaseline, Aquaphor, Polysporin ointment or Bacitracin ointment over entire wound.  Do NOT use Neosporin ointment.   4) Cover the wound with a band-aid, or a sterile non-stick gauze pad and micropore paper tape    REPEAT THESE INSTRUCTIONS AT LEAST ONCE A DAY UNTIL THE WOUND HAS COMPLETELY HEALED.   **Do not let the wound dry out.**  It is an old wives tale that a wound heals better when it is exposed to air and allowed to dry out. The wound will heal faster with a better cosmetic result if it is kept moist with ointment and covered with a bandage.    Supplies Needed:   *Cotton tipped applicators (Q-tips)   *Vaseline, Aquaphor, Polysporin or Bacitracin Ointment (NOT NEOSPORIN)   *Band-aids or non-stick gauze pads and micropore paper tape    PATIENT INFORMATION:  During the healing process you will notice a number of changes. All wounds develop a small halo of redness surrounding the wound.  This means healing is occurring. Severe itching with extensive redness usually indicates sensitivity to the ointment or bandage tape used to dress the wound.  You should call our office if this develops.      Swelling  and/or discoloration around your surgical site is common, particularly when performed around the eye.    All wounds normally drain.  The larger the wound the more drainage there will be.  After 7-10 days, you will notice the wound beginning to shrink and new skin will begin to grow.  The wound is healed when you can see skin has formed over the entire area.  A healed wound has a healthy, shiny look to the surface and is red to dark pink in color to normalize.  Wounds may take approximately 4-6 weeks to heal.  Larger wounds may  take 6-8 weeks.  After the wound is healed you may discontinue dressing changes.    You may experience a sensation of tightness as your wound heals. This is normal and will gradually subside.    Your healed wound may be sensitive to temperature changes. This sensitivity improves with time, but if you re having a lot of discomfort, try to avoid temperature extremes.    Patients frequently experience itching after their wound appears to have healed because of the continue healing under the skin.  Plain Vaseline will help relieve the itching.      POSSIBLE COMPLICATIONS    BLEEDING:    Leave the bandage in place.  Use tightly rolled up gauze or a cloth to apply direct pressure over the bandage for 30  minutes.  Reapply pressure for an additional 30 minutes if necessary  Use additional gauze and tape to maintain pressure once the bleeding has stopped.   _________________________________________    CRYOTHERAPY     What is it?  Use of a very cold liquid, such as liquid nitrogen, to freeze and destroy abnormal skin cells that need to be removed.    What should I expect?  Tenderness and redness.  A small blister that might grow and fill with dark purple blood.  More than one treatment may be needed if the lesions do not go away.    How do I care for the treated area?  Gently wash the area with your hands when bathing.  Use a thin layer of VaselineÆ to help with healing.   The area should heal within 7-10 days.  Do not use an antibiotic or NeosporinÆ ointment.   You may take acetaminophen (TylenolÆ) for pain.     Call your Doctor if you have:  Severe pain.  Signs of infection (warmth, redness, cloudy yellow drainage, and or a bad smell).  Questions or concerns.  _________________________________________________________________

## 2024-11-06 LAB
PATH REPORT.COMMENTS IMP SPEC: ABNORMAL
PATH REPORT.COMMENTS IMP SPEC: ABNORMAL
PATH REPORT.COMMENTS IMP SPEC: YES
PATH REPORT.FINAL DX SPEC: ABNORMAL
PATH REPORT.GROSS SPEC: ABNORMAL
PATH REPORT.MICROSCOPIC SPEC OTHER STN: ABNORMAL
PATH REPORT.RELEVANT HX SPEC: ABNORMAL

## 2024-11-07 ENCOUNTER — TELEPHONE (OUTPATIENT)
Dept: DERMATOLOGY | Facility: CLINIC | Age: 44
End: 2024-11-07
Payer: COMMERCIAL

## 2024-11-07 NOTE — TELEPHONE ENCOUNTER
PT called back and discussed options, pt going to discuss with wife and call back with what he wants to do.    Sofia TORRES RN  Dermatology   [phone]

## 2024-11-07 NOTE — TELEPHONE ENCOUNTER
Patient Contact    Attempt # 1    Was call answered?  No    Left message on answering machine for patient to call back.    Sofia TORRES RN  Dermatology   [phone]

## 2024-11-07 NOTE — TELEPHONE ENCOUNTER
----- Message from Leo Oreilly sent at 11/6/2024  4:30 PM CST -----  Please let pt know offer imiquimod daily x6 weeks vs ED&C vs excision    Final Diagnosis   Right superior back:  - Basal cell carcinoma, superficial type - (see description)

## 2025-01-27 ENCOUNTER — OFFICE VISIT (OUTPATIENT)
Dept: DERMATOLOGY | Facility: CLINIC | Age: 45
End: 2025-01-27
Payer: COMMERCIAL

## 2025-01-27 DIAGNOSIS — C44.91 SUPERFICIAL BASAL CELL CARCINOMA: Primary | ICD-10-CM

## 2025-01-27 NOTE — PROGRESS NOTES
I have personally examined this patient and agree with thePA's documentation and plan of care. I have reviewed and amended the PA's note. The documentation accurately reflects my clinical observations, diagnoses, treatment and follow-up plans. I was present for key portions of the procedure(s).       Leo Oreilly MD  Dermatology Staff      ELECTRODESSICATION AND CURETTAGE PROCEDURE NOTE     Surgeon: Britney Zavala PA-C  Supervising surgeon: Leo Oreilly MD  Site: R superior upper back  Size of lesion: 1.0 x 0.9 cm  Pathology: Superficial BCC    Rationale for procedure: Malignant    A time out was taken to identify the patient and the correct site for the correct procedure.  Written informed consent was obtained.  Discussed risks including but not limited to: pain, bleeding, infection, scarring (the latter being essentially guaranteed)  The lesion was cleansed with a 70% isopropyl alcohol wipe and then injected with 2 cc of lidocaine 1% with 1:[id number] epinephrine. After anesthesia was ensured, a 4mm nondisposable curette was used to remove the epidermis and superficial dermis of the entire visible lesion. A 4mm margin around the periphery was gently passed over using the curette to evaluate to subclinical disease. Two cycles of curettage follow by electrofulguration and electrodessication of the base in standard fashion was performed.    The wound was then dressed with vaseline and a bandage; subsequent wound care was discussed in detail.    The final size of the defect was 1.4 x 1.3 cm.    Vineet Zavala PA-C  [facility]

## 2025-01-27 NOTE — LETTER
1/27/2025      Jose Luis Mazariegos  [address] [facility] [address]      Dear Colleague,    Thank you for referring your patient, Jose Luis Mazariegos, to the [facility]. Please see a copy of my visit note below.    I have personally examined this patient and agree with thePA's documentation and plan of care. I have reviewed and amended the PA's note. The documentation accurately reflects my clinical observations, diagnoses, treatment and follow-up plans. I was present for key portions of the procedure(s).       Leo Oreilly MD  Dermatology Staff      ELECTRODESSICATION AND CURETTAGE PROCEDURE NOTE     Surgeon: Britney Zavala PA-C  Supervising surgeon: Leo Oreilly MD  Site: R superior upper back  Size of lesion: 1.0 x 0.9 cm  Pathology: Superficial BCC    Rationale for procedure: Malignant    A time out was taken to identify the patient and the correct site for the correct procedure.  Written informed consent was obtained.  Discussed risks including but not limited to: pain, bleeding, infection, scarring (the latter being essentially guaranteed)  The lesion was cleansed with a 70% isopropyl alcohol wipe and then injected with 2 cc of lidocaine 1% with 1:[id number] epinephrine. After anesthesia was ensured, a 4mm nondisposable curette was used to remove the epidermis and superficial dermis of the entire visible lesion. A 4mm margin around the periphery was gently passed over using the curette to evaluate to subclinical disease. Two cycles of curettage follow by electrofulguration and electrodessication of the base in standard fashion was performed.    The wound was then dressed with vaseline and a bandage; subsequent wound care was discussed in detail.    The final size of the defect was 1.4 x 1.3 cm.    Vineet Zavala PA-C  [facility]        Again, thank you for allowing me to participate in the care of your patient.        Sincerely,        Leo Oreilly  MD    Electronically signed

## 2025-06-03 ENCOUNTER — PATIENT OUTREACH (OUTPATIENT)
Dept: CARE COORDINATION | Facility: CLINIC | Age: 45
End: 2025-06-03
Payer: COMMERCIAL

## 2025-06-09 ENCOUNTER — OFFICE VISIT (OUTPATIENT)
Dept: DERMATOLOGY | Facility: CLINIC | Age: 45
End: 2025-06-09
Payer: COMMERCIAL

## 2025-06-09 DIAGNOSIS — L57.8 ACTINIC SKIN DAMAGE: ICD-10-CM

## 2025-06-09 DIAGNOSIS — Z85.828 HISTORY OF BASAL CELL CARCINOMA: Primary | ICD-10-CM

## 2025-06-09 DIAGNOSIS — L81.4 LENTIGINES: ICD-10-CM

## 2025-06-09 DIAGNOSIS — D22.9 MULTIPLE BENIGN NEVI: ICD-10-CM

## 2025-06-09 PROCEDURE — 99213 OFFICE O/P EST LOW 20 MIN: CPT | Performed by: STUDENT IN AN ORGANIZED HEALTH CARE EDUCATION/TRAINING PROGRAM

## 2025-06-09 NOTE — PROGRESS NOTES
[facility] Dermatology Note    Encounter Date: Jun 9, 2025    Dermatology Problem List:  #Hx NMSC  - 11/04/25 [facility] R back s/p ED&C    Major PMHx  -   ______________________________________    Impression/Plan:  Godwin was seen today for skin check.    Diagnoses and all orders for this visit:    History of basal cell carcinoma  - No obvious evidence of recurrence at site of previous malignancy  - discussed pinkness and mounded-ness of scar is normal     Multiple benign nevi  Lentigines  Actinic skin damage  - Reviewed the compounding benefits of incremental changes to sun protective behaviors including increased frequency of sunscreen and sun protective clothing like broad brimmed hats and longsleeved UPF containing clothing      Follow-up in 6 mo.       Staff Involved:  Staff Only    Leo Oreilly MD   of Dermatology  Department of Dermatology  [facility]      CC:   Chief Complaint   Patient presents with    Skin Check     6 month  No concerns       History of Present Illness:  Mr. Jose Luis Mazariegos is a 44 year old male who presents as a new patient.    Pt presents today for concerns about spots on trunk and extremities. Scar is a little lumpy      Labs:      Physical exam:  Vitals: There were no vitals taken for this visit.  GEN: well developed, well-nourished, in no acute distress, in a pleasant mood.     SKIN: Garcia phototype 1  - Waist-up skin, which includes the head/face, neck, both arms, chest, back, abdomen, digits and/or nails was examined.  - Flat brown macules and patches in a sun exposed areas on face and extremities  - No obvious evidence of recurrence at site of previous malignancy  - No other lesions of concern on areas examined.     Past Medical History:   Past Medical History:   Diagnosis Date    Depressive disorder 2001    I have not had problems with depression for about 10 years    Perianal fistula      Past Surgical  History:   Procedure Laterality Date    rectal fistula  01/01/2009    VASECTOMY         Social History:   reports that he quit smoking about 24 years ago. His smoking use included cigarettes. He started smoking about 26 years ago. He has a 2 pack-year smoking history. He has never used smokeless tobacco. He reports current alcohol use. He reports that he does not use drugs.    Family History:  Family History   Problem Relation Age of Onset    Skin Cancer Mother         Not sure what kind    Other Cancer Mother         Skin cancer    Skin Cancer Father     Depression Sister     Anxiety Disorder Sister     Skin Cancer Sister         Not sure what kind    Other Cancer Sister         Skin cancer    Dementia Maternal Grandmother     Cancer Maternal Grandfather     Myocardial Infarction Paternal Grandfather     Coronary Artery Disease Paternal Grandfather     Myocardial Infarction Paternal Uncle     Cancer Paternal Uncle     Colon Cancer No family hx of     Prostate Cancer No family hx of     Diabetes No family hx of     Hypertension No family hx of        Medications:  No current outpatient medications on file.     No Known Allergies

## 2025-06-09 NOTE — LETTER
6/9/2025      Jose Luis Mazariegos  [address] [facility] [address]      Dear Colleague,    Thank you for referring your patient, Jose Luis Mazariegos, to the [facility]. Please see a copy of my visit note below.    [facility] Dermatology Note    Encounter Date: Jun 9, 2025    Dermatology Problem List:  #Hx NMSC  - 11/04/25 [facility] R back s/p ED&C    Major PMHx  -   ______________________________________    Impression/Plan:  Godwin was seen today for skin check.    Diagnoses and all orders for this visit:    History of basal cell carcinoma  - No obvious evidence of recurrence at site of previous malignancy  - discussed pinkness and mounded-ness of scar is normal     Multiple benign nevi  Lentigines  Actinic skin damage  - Reviewed the compounding benefits of incremental changes to sun protective behaviors including increased frequency of sunscreen and sun protective clothing like broad brimmed hats and longsleeved UPF containing clothing      Follow-up in 6 mo.       Staff Involved:  Staff Only    Leo Oreilly MD   of Dermatology  Department of Dermatology  [facility]      CC:   Chief Complaint   Patient presents with     Skin Check     6 month  No concerns       History of Present Illness:  Mr. Jose Luis Mazariegos is a 44 year old male who presents as a new patient.    Pt presents today for concerns about spots on trunk and extremities. Scar is a little lumpy      Labs:      Physical exam:  Vitals: There were no vitals taken for this visit.  GEN: well developed, well-nourished, in no acute distress, in a pleasant mood.     SKIN: Garcia phototype 1  - Waist-up skin, which includes the head/face, neck, both arms, chest, back, abdomen, digits and/or nails was examined.  - Flat brown macules and patches in a sun exposed areas on face and extremities  - No obvious evidence of recurrence at site of previous  malignancy  - No other lesions of concern on areas examined.     Past Medical History:   Past Medical History:   Diagnosis Date     Depressive disorder 2001    I have not had problems with depression for about 10 years     Perianal fistula      Past Surgical History:   Procedure Laterality Date     rectal fistula  01/01/2009     VASECTOMY         Social History:   reports that he quit smoking about 24 years ago. His smoking use included cigarettes. He started smoking about 26 years ago. He has a 2 pack-year smoking history. He has never used smokeless tobacco. He reports current alcohol use. He reports that he does not use drugs.    Family History:  Family History   Problem Relation Age of Onset     Skin Cancer Mother         Not sure what kind     Other Cancer Mother         Skin cancer     Skin Cancer Father      Depression Sister      Anxiety Disorder Sister      Skin Cancer Sister         Not sure what kind     Other Cancer Sister         Skin cancer     Dementia Maternal Grandmother      Cancer Maternal Grandfather      Myocardial Infarction Paternal Grandfather      Coronary Artery Disease Paternal Grandfather      Myocardial Infarction Paternal Uncle      Cancer Paternal Uncle      Colon Cancer No family hx of      Prostate Cancer No family hx of      Diabetes No family hx of      Hypertension No family hx of        Medications:  No current outpatient medications on file.     No Known Allergies              Again, thank you for allowing me to participate in the care of your patient.        Sincerely,        Leo Oreilly MD    Electronically signed

## 2025-06-11 SDOH — HEALTH STABILITY: PHYSICAL HEALTH: ON AVERAGE, HOW MANY MINUTES DO YOU ENGAGE IN EXERCISE AT THIS LEVEL?: 30 MIN

## 2025-06-11 SDOH — HEALTH STABILITY: PHYSICAL HEALTH: ON AVERAGE, HOW MANY DAYS PER WEEK DO YOU ENGAGE IN MODERATE TO STRENUOUS EXERCISE (LIKE A BRISK WALK)?: 2 DAYS

## 2025-06-11 ASSESSMENT — SOCIAL DETERMINANTS OF HEALTH (SDOH): HOW OFTEN DO YOU GET TOGETHER WITH FRIENDS OR RELATIVES?: TWICE A WEEK

## 2025-06-16 ENCOUNTER — OFFICE VISIT (OUTPATIENT)
Dept: INTERNAL MEDICINE | Facility: CLINIC | Age: 45
End: 2025-06-16
Payer: COMMERCIAL

## 2025-06-16 VITALS
RESPIRATION RATE: 16 BRPM | DIASTOLIC BLOOD PRESSURE: 86 MMHG | WEIGHT: 237 LBS | HEART RATE: 75 BPM | SYSTOLIC BLOOD PRESSURE: 124 MMHG | OXYGEN SATURATION: 98 % | HEIGHT: 70 IN | BODY MASS INDEX: 33.93 KG/M2 | TEMPERATURE: 97.8 F

## 2025-06-16 DIAGNOSIS — E78.5 HYPERLIPIDEMIA LDL GOAL <100: ICD-10-CM

## 2025-06-16 DIAGNOSIS — E66.811 CLASS 1 OBESITY DUE TO EXCESS CALORIES WITH SERIOUS COMORBIDITY AND BODY MASS INDEX (BMI) OF 34.0 TO 34.9 IN ADULT: ICD-10-CM

## 2025-06-16 DIAGNOSIS — R06.83 SNORING: ICD-10-CM

## 2025-06-16 DIAGNOSIS — E66.09 CLASS 1 OBESITY DUE TO EXCESS CALORIES WITH SERIOUS COMORBIDITY AND BODY MASS INDEX (BMI) OF 34.0 TO 34.9 IN ADULT: ICD-10-CM

## 2025-06-16 DIAGNOSIS — Z00.00 ENCOUNTER FOR ANNUAL PHYSICAL EXAM: Primary | ICD-10-CM

## 2025-06-16 LAB
CHOLEST SERPL-MCNC: 204 MG/DL
FASTING STATUS PATIENT QL REPORTED: YES
HDLC SERPL-MCNC: 61 MG/DL
LDLC SERPL CALC-MCNC: 130 MG/DL
NONHDLC SERPL-MCNC: 143 MG/DL
TRIGL SERPL-MCNC: 67 MG/DL

## 2025-06-16 PROCEDURE — 80061 LIPID PANEL: CPT | Performed by: NURSE PRACTITIONER

## 2025-06-16 PROCEDURE — 3074F SYST BP LT 130 MM HG: CPT | Performed by: NURSE PRACTITIONER

## 2025-06-16 PROCEDURE — 99396 PREV VISIT EST AGE 40-64: CPT | Performed by: NURSE PRACTITIONER

## 2025-06-16 PROCEDURE — 3079F DIAST BP 80-89 MM HG: CPT | Performed by: NURSE PRACTITIONER

## 2025-06-16 PROCEDURE — 36415 COLL VENOUS BLD VENIPUNCTURE: CPT | Performed by: NURSE PRACTITIONER

## 2025-06-16 NOTE — PROGRESS NOTES
"Preventive Care Visit  [facility]  Danuta Alexis NP, Internal Medicine  Jun 16, 2025      Assessment & Plan     Encounter for annual physical exam  Reviewed overall health maintenance.  Will be due for screening for colorectal cancer at age 45.  Return in 1 year for annual exam.  - REVIEW OF HEALTH MAINTENANCE PROTOCOL ORDERS    Snoring  Referral for sleep study placed.  - Adult Sleep Eval & Management  Referral; Future    Hyperlipidemia LDL goal <100  We will check lipid panel today.  Encouraged lifestyle modifications with diet and exercise.  - Lipid panel reflex to direct LDL Non-fasting; Future    Class 1 obesity due to excess calories with serious comorbidity and body mass index (BMI) of 34.0 to 34.9 in adult  Discussed the importance of healthy diet and exercise to promote weight loss.          BMI  Estimated body mass index is 34.47 kg/m  as calculated from the following:    Height as of this encounter: 1.766 m (5' 9.53\").    Weight as of this encounter: 107.5 kg (237 lb).   Weight management plan: Discussed healthy diet and exercise guidelines    Counseling  Appropriate preventive services were addressed with this patient via screening, questionnaire, or discussion as appropriate for fall prevention, nutrition, physical activity, Tobacco-use cessation, social engagement, weight loss and cognition.  Checklist reviewing preventive services available has been given to the patient.  Reviewed patient's diet, addressing concerns and/or questions.   He is at risk for lack of exercise and has been provided with information to increase physical activity for the benefit of his well-being.       Graciela Connelly is a 44 year old, presenting for the following:  Physical (Pt is fasting- would like to discuss referral for sleep apnea)        6/16/2025     1:05 PM   Additional Questions   Roomed by Jeimy RYAN  Godwin is a pleasant 44-year-old male here today for an annual exam.  " He is  to his wife Marycarmen and has 2 children.  First child is 16 from a previous relationship and have another half a 2-year-old daughter.  Has concerns today of possible sleep apnea.  States his wife feels like he might occasionally stop breathing while he is sleeping.  He does snore.  Has not previously been diagnosed with sleep apnea.  Denies any fatigue throughout the day.           Advance Care Planning    Discussed advance care planning with patient; informed AVS has link to [employer].        6/11/2025   General Health   How would you rate your overall physical health? Good   Feel stress (tense, anxious, or unable to sleep) Not at all         6/11/2025   Nutrition   Three or more servings of calcium each day? Yes   Diet: Regular (no restrictions)   How many servings of fruit and vegetables per day? (!) 2-3   How many sweetened beverages each day? 0-1         6/11/2025   Exercise   Days per week of moderate/strenous exercise 2 days   Average minutes spent exercising at this level 30 min   (!) EXERCISE CONCERN      6/11/2025   Social Factors   Frequency of gathering with friends or relatives Twice a week   Worry food won't last until get money to buy more No   Food not last or not have enough money for food? No   Do you have housing? (Housing is defined as stable permanent housing and does not include staying outside in a car, in a tent, in an abandoned building, in an overnight shelter, or couch-surfing.) Yes   Are you worried about losing your housing? No   Lack of transportation? No   Unable to get utilities (heat,electricity)? No         6/11/2025   Dental   Dentist two times every year? Yes           Today's PHQ-2 Score:       1/27/2025     6:53 AM   PHQ-2 ( 1999 [employer])   Q1: Little interest or pleasure in doing things 0   Q2: Feeling down, depressed or hopeless 0   PHQ-2 Score 0         6/11/2025   Substance Use   Alcohol more than 3/day or more than 7/wk No   Do you use any other  "substances recreationally? No     Social History     Tobacco Use    Smoking status: Former     Current packs/day: 0.00     Average packs/day: 1 pack/day for 2.0 years (2.0 ttl pk-yrs)     Types: Cigarettes     Start date: 1998     Quit date: 2000     Years since quittin.9     Passive exposure: Past    Smokeless tobacco: Never   Vaping Use    Vaping status: Never Used   Substance Use Topics    Alcohol use: Yes     Comment: Not regularly, more socially.    Drug use: No           2025   STI Screening   New sexual partner(s) since last STI/HIV test? No   ASCVD Risk   The 10-year ASCVD risk score (Rosa NOBLE, et al., 2019) is: 1.6%    Values used to calculate the score:      Age: 44 years      Sex: Male      Is Non- : No      Diabetic: No      Tobacco smoker: No      Systolic Blood Pressure: 124 mmHg      Is BP treated: No      HDL Cholesterol: 53 mg/dL      Total Cholesterol: 199 mg/dL       Reviewed and updated as needed this visit by Provider                  ROS  Comprehensive 12-point review of systems was completed and negative except as noted in HPI.       Objective    Exam  /86 (BP Location: Right arm, Patient Position: Sitting)   Pulse 75   Temp 97.8  F (36.6  C)   Resp 16   Ht 1.766 m (5' 9.53\")   Wt 107.5 kg (237 lb)   SpO2 98%   BMI 34.47 kg/m     Estimated body mass index is 34.47 kg/m  as calculated from the following:    Height as of this encounter: 1.766 m (5' 9.53\").    Weight as of this encounter: 107.5 kg (237 lb).    Physical Exam  Constitutional: In no acute distress.  Clean appearance.  Ears: Bilateral TMs are intact without any erythema or effusion.  Grossly normal hearing.  Oropharynx: Normal mucosa.  Dentition and gingiva is appropriate.  Posterior oropharynx without any abnormalities.  Neck: Supple.  Trachea is midline.  No thyromegaly.  Neck is without tenderness or masses.  Cardiovascular: Regular rate and rhythm.  Normal " peripheral perfusion.  No edema.   Respiratory: Lungs are clear bilaterally.  Normal respiratory effort.  Skin: Skin is pink, warm and dry.  Gastrointestinal: Soft and flat.  Normal bowel sounds.  Nontender throughout upon palpation.  No organomegaly or masses.  Negative for CVA tenderness.  Genitourinary: Deferred.  No concerns.  Musculoskeletal:  Normal range of motion of extremities.  Gait normal.  Able to mount exam table without difficulties.  Psychiatric: Appropriate affect and demeanor.  Memory intact.  Good insight and judgment.  Neurologic: Sensation and temperature of extremities appropriate.  No tremor or involuntary movement noted.         Signed Electronically by: Danuta Alexis NP

## 2025-06-16 NOTE — PATIENT INSTRUCTIONS
Labs today. Will let you know results through [facility] once available.      Referral for sleep study placed.     Colorectal cancer screening with cologuard or colonoscopy due in 6-12 months for routine screening.     Eat a quality diet (generally, low in simple sugars, starches, cholesterol and saturated fat.)    Limit alcohol to no more than 1-2 in 24 hours, as higher use increases your weight, can damage your liver or pancreas, and increases triglycerides (fat in the blood). Never drink and drive.    Exercise regularly. Ideally you would have 30 minutes of aerobic exercise at least 4 times weekly. Find something you enjoy and a friend to do it with you.    Apply sun block (SPF 25 or greater) on exposed skin anytime you are out in the sun to prevent skin cancer.     Do testicular self-exam once monthly. Schedule an appointment if you find a nodule on one of the testicles or have another finding that concerns you.    Wear a seatbelt whenever you are in a car.    Consider a flu shot every fall.    Follow up in one year, return earlier if needed

## 2025-06-17 ENCOUNTER — RESULTS FOLLOW-UP (OUTPATIENT)
Dept: INTERNAL MEDICINE | Facility: CLINIC | Age: 45
End: 2025-06-17

## 2025-06-17 ENCOUNTER — PATIENT OUTREACH (OUTPATIENT)
Dept: CARE COORDINATION | Facility: CLINIC | Age: 45
End: 2025-06-17
Payer: COMMERCIAL

## 2025-06-19 ENCOUNTER — PATIENT OUTREACH (OUTPATIENT)
Dept: CARE COORDINATION | Facility: CLINIC | Age: 45
End: 2025-06-19
Payer: COMMERCIAL